# Patient Record
Sex: FEMALE | Race: WHITE | Employment: UNEMPLOYED | URBAN - METROPOLITAN AREA
[De-identification: names, ages, dates, MRNs, and addresses within clinical notes are randomized per-mention and may not be internally consistent; named-entity substitution may affect disease eponyms.]

---

## 2021-01-04 ENCOUNTER — VIRTUAL VISIT (OUTPATIENT)
Dept: FAMILY MEDICINE CLINIC | Age: 51
End: 2021-01-04
Payer: COMMERCIAL

## 2021-01-04 DIAGNOSIS — Z91.89 AT INCREASED RISK OF EXPOSURE TO COVID-19 VIRUS: Primary | ICD-10-CM

## 2021-01-04 DIAGNOSIS — Z91.89 AT INCREASED RISK OF EXPOSURE TO COVID-19 VIRUS: ICD-10-CM

## 2021-01-04 PROCEDURE — 99441 PR PHYS/QHP TELEPHONE EVALUATION 5-10 MIN: CPT | Performed by: PHYSICIAN ASSISTANT

## 2021-01-04 ASSESSMENT — ENCOUNTER SYMPTOMS
CHEST TIGHTNESS: 0
DIARRHEA: 0
RHINORRHEA: 1
SINUS PRESSURE: 1
BACK PAIN: 0
VOMITING: 0
SHORTNESS OF BREATH: 0
TROUBLE SWALLOWING: 0
COUGH: 0
ABDOMINAL PAIN: 0

## 2021-01-04 NOTE — PROGRESS NOTES
change. HENT: Positive for rhinorrhea and sinus pressure. Negative for drooling, ear pain, nosebleeds and trouble swallowing. Respiratory: Negative for cough, chest tightness and shortness of breath. Cardiovascular: Negative for chest pain and leg swelling. Gastrointestinal: Negative for abdominal pain, diarrhea and vomiting. Endocrine: Negative for polydipsia and polyphagia. Genitourinary: Negative for dysuria, flank pain and frequency. Musculoskeletal: Negative for back pain and myalgias. Skin: Negative for pallor and rash. Neurological: Negative for syncope, weakness and headaches. Hematological: Does not bruise/bleed easily. All other systems reviewed and are negative. Prior to Visit Medications    Not on File       No past medical history on file. No past surgical history on file.   Social History     Socioeconomic History    Marital status: Unknown     Spouse name: Not on file    Number of children: Not on file    Years of education: Not on file    Highest education level: Not on file   Occupational History    Not on file   Social Needs    Financial resource strain: Not on file    Food insecurity     Worry: Not on file     Inability: Not on file    Transportation needs     Medical: Not on file     Non-medical: Not on file   Tobacco Use    Smoking status: Not on file   Substance and Sexual Activity    Alcohol use: Not on file    Drug use: Not on file    Sexual activity: Not on file   Lifestyle    Physical activity     Days per week: Not on file     Minutes per session: Not on file    Stress: Not on file   Relationships    Social connections     Talks on phone: Not on file     Gets together: Not on file     Attends Sabianism service: Not on file     Active member of club or organization: Not on file     Attends meetings of clubs or organizations: Not on file     Relationship status: Not on file    Intimate partner violence     Fear of current or ex partner: Not on file     Emotionally abused: Not on file     Physically abused: Not on file     Forced sexual activity: Not on file   Other Topics Concern    Not on file   Social History Narrative    Not on file     No family history on file. Not on File    PMH, Surgical Hx, Family Hx, and Social Hx reviewed and updated. Health Maintenance reviewed. PHYSICAL EXAMINATION:  \"[x]\" Indicates a positive item  \"[]\" Indicates a negative item    Vital Signs: (As obtained by patient/caregiver or practitioner observation)    Blood pressure-  Heart rate-    Respiratory rate-    Temperature-  Pulse oximetry-     Constitutional: [x] Appears well-developed and well-nourished [x] No apparent distress      [] Abnormal-   Mental status  [x] Alert and awake  [x] Oriented to person/place/time [x]Able to follow commands      Eyes:  EOM    []  Normal  [] Abnormal-  Sclera  [x]  Normal  [] Abnormal -         Discharge [x]  None visible  [] Abnormal -    HENT:   [x] Normocephalic, atraumatic. [] Abnormal   [x] Mouth/Throat: Mucous membranes are moist.     External Ears [x] Normal  [] Abnormal-     Neck: [x] No visualized mass     Pulmonary/Chest: [x] Respiratory effort normal.  [x] No visualized signs of difficulty breathing or respiratory distress        [] Abnormal-      Musculoskeletal:   [x] Normal gait with no signs of ataxia         [x] Normal range of motion of neck        [] Abnormal-       Neurological:       [x] No Facial Asymmetry (Cranial nerve 7 motor function) (limited exam to video visit)          [x] No gaze palsy        [] Abnormal-         Skin:        [x] No significant exanthematous lesions or discoloration noted on facial skin         [] Abnormal-            Psychiatric:       [x] Normal Affect [x] No Hallucinations        [] Abnormal-     Other pertinent observable physical exam findings-   No results found for this or any previous visit.     ASSESSMENT/PLAN:  Assessment & Plan   There are no diagnoses linked to this encounter. No orders of the defined types were placed in this encounter. No orders of the defined types were placed in this encounter. There are no discontinued medications. No follow-ups on file. Reviewed with the patient: current clinical status, medications, activities and diet. Side effects, adverse effects of the medication prescribed today, as well as treatment plan/ rationale and result expectations have been discussed with the patient who expresses understanding and desires to proceed. Close follow up to evaluate treatment results and for coordination of care. I have reviewed the patient's medical history in detail and updated the computerized patient record. Patient identification was verified at the start of the visit: Yes    Total time spent on this encounter: Not billed by time      --XIMENA Maharaj on 1/4/2021 at 6:16 PM    An electronic signature was used to authenticate this note.

## 2021-01-06 LAB
SARS-COV-2: DETECTED
SOURCE: ABNORMAL

## 2023-06-16 LAB — COBALAMIN (VITAMIN B12) (PG/ML) IN SER/PLAS: 493 PG/ML (ref 211–911)

## 2023-06-24 LAB
ABO GROUP (TYPE) IN BLOOD: NORMAL
ANION GAP IN SER/PLAS: 10 MMOL/L (ref 10–20)
ANTIBODY SCREEN: NORMAL
CALCIUM (MG/DL) IN SER/PLAS: 9.5 MG/DL (ref 8.6–10.3)
CARBON DIOXIDE, TOTAL (MMOL/L) IN SER/PLAS: 29 MMOL/L (ref 21–32)
CHLORIDE (MMOL/L) IN SER/PLAS: 105 MMOL/L (ref 98–107)
CHORIOGONADOTROPIN (MIU/ML) IN SER/PLAS: 2 MIU/ML
CREATININE (MG/DL) IN SER/PLAS: 0.8 MG/DL (ref 0.5–1.05)
ERYTHROCYTE DISTRIBUTION WIDTH (RATIO) BY AUTOMATED COUNT: 12.6 % (ref 11.5–14.5)
ERYTHROCYTE MEAN CORPUSCULAR HEMOGLOBIN CONCENTRATION (G/DL) BY AUTOMATED: 32.5 G/DL (ref 32–36)
ERYTHROCYTE MEAN CORPUSCULAR VOLUME (FL) BY AUTOMATED COUNT: 93 FL (ref 80–100)
ERYTHROCYTES (10*6/UL) IN BLOOD BY AUTOMATED COUNT: 4.16 X10E12/L (ref 4–5.2)
GFR FEMALE: 88 ML/MIN/1.73M2
GLUCOSE (MG/DL) IN SER/PLAS: 99 MG/DL (ref 74–99)
HEMATOCRIT (%) IN BLOOD BY AUTOMATED COUNT: 38.5 % (ref 36–46)
HEMOGLOBIN (G/DL) IN BLOOD: 12.5 G/DL (ref 12–16)
LEUKOCYTES (10*3/UL) IN BLOOD BY AUTOMATED COUNT: 5.7 X10E9/L (ref 4.4–11.3)
PLATELETS (10*3/UL) IN BLOOD AUTOMATED COUNT: 190 X10E9/L (ref 150–450)
POTASSIUM (MMOL/L) IN SER/PLAS: 4.1 MMOL/L (ref 3.5–5.3)
RH FACTOR: NORMAL
SODIUM (MMOL/L) IN SER/PLAS: 140 MMOL/L (ref 136–145)
UREA NITROGEN (MG/DL) IN SER/PLAS: 17 MG/DL (ref 6–23)

## 2023-06-27 ENCOUNTER — HOSPITAL ENCOUNTER (OUTPATIENT)
Dept: DATA CONVERSION | Facility: HOSPITAL | Age: 53
End: 2023-06-27
Attending: OBSTETRICS & GYNECOLOGY | Admitting: OBSTETRICS & GYNECOLOGY
Payer: COMMERCIAL

## 2023-06-27 DIAGNOSIS — N39.41 URGE INCONTINENCE: ICD-10-CM

## 2023-06-27 DIAGNOSIS — R35.0 FREQUENCY OF MICTURITION: ICD-10-CM

## 2023-06-27 LAB — URINE CULTURE: ABNORMAL

## 2023-09-16 ENCOUNTER — HOSPITAL ENCOUNTER (OUTPATIENT)
Facility: HOSPITAL | Age: 53
Setting detail: OUTPATIENT SURGERY
End: 2023-09-16
Attending: OBSTETRICS & GYNECOLOGY | Admitting: OBSTETRICS & GYNECOLOGY
Payer: COMMERCIAL

## 2023-09-30 NOTE — H&P
History of Present Illness:   Pregnant/Lactating:  ·  Are You Pregnant no   ·  Are You Currently Breastfeeding no     History Present Illness:  Reason for surgery: Urge incontinence   HPI:     urge incontinence    Allergies:        Allergies:  ·  Compazine : Other  ·  minocycline : Other       Intolerances:  ·  Keflex : Nausea/Vomiting  ·  fentanyl : Nausea/Vomiting  ·  ropivacaine : Nausea/Vomiting    Home Medication Review:   Home Medications Reviewed: yes     Impression/Procedure:   ·  Impression and Planned Procedure: cystoscopy Botox 150 units       ERAS (Enhanced Recovery After Surgery):  ·  ERAS Patient: no       Physical Exam by System:    Constitutional: Well developed, awake/alert/oriented  x3, no distress, alert and cooperative   Head/Neck: Neck supple   Respiratory/Thorax: No respiratory distress   Cardiovascular: No cardiac distress   Musculoskeletal: ROM intact, no joint swelling, normal  strength   Lymphatic: No lymphadenopathy   Skin: Warm and dry, no lesions, no rashes     Consent:   COVID-19 Consent:  ·  COVID-19 Risk Consent Surgeon has reviewed key risks related to the risk of marta COVID-19 and if they contract COVID-19 what the risks are.       Electronic Signatures:  Ruben Winston)  (Signed 27-Jun-2023 10:46)   Authored: History of Present Illness, Allergies, Home  Medication Review, Impression/Procedure, ERAS, Physical Exam, Consent, Note Completion      Last Updated: 27-Jun-2023 10:46 by Ruben Winston)

## 2023-10-02 NOTE — OP NOTE
PROCEDURE DETAILS    Preoperative Diagnosis:      Postoperative Diagnosis:  urge incontinence, N39.41    Surgeon: Ruben Winston  Resident/Fellow/Other Assistant: Michael Camacho    Procedure:  1. OPERATIVE CYSTOSCOPY, INTRADETRUSOR BOTOX 150    Anesthesia: No anesthesiologist associated with this case  Estimated Blood Loss: min  Findings:  normal anatomy  Specimens(s) Collected: no,           Operative Report:     Operative procedure patient was taken to the operating room after informed consent was obtained she is placed in supine position mask anesthesia  was started without complication. She is prepped and draped in the normal sterile fashion and operative cystoscopy was placed inside the bladder bowel surfaces were surveyed bilateral reflux was noted from the ureter orifices. A urethral inspection was  then performed upon removal of the cystoscope the bladder was drained and refilled clear sterile water. In a fan like projection 0.5ml aliquots of diluted Botox were introduced into the bladder muscle. The flush was used to inject into the trigone. Total  Botox use was 150U in approximately 20ml saline. Scant bleeding was noted from the urothelial tissue, the bladder was drained and patient was awoken from anesthesia and transferred to PACU in stable condition.                        Attestation:   Note Completion:  Attending Attestation I performed the procedure without a resident         Electronic Signatures:  Ruben Winston)  (Signed 27-Jun-2023 12:29)   Authored: Post-Operative Note, Chart Review, Note Completion      Last Updated: 27-Jun-2023 12:29 by Ruben Winston)

## 2023-10-09 ENCOUNTER — HOSPITAL ENCOUNTER (OUTPATIENT)
Dept: RADIOLOGY | Facility: HOSPITAL | Age: 53
Discharge: HOME | End: 2023-10-09
Payer: COMMERCIAL

## 2023-10-09 VITALS — BODY MASS INDEX: 26.52 KG/M2 | HEIGHT: 66 IN | WEIGHT: 165 LBS

## 2023-10-09 DIAGNOSIS — Z12.31 ENCOUNTER FOR SCREENING MAMMOGRAM FOR MALIGNANT NEOPLASM OF BREAST: ICD-10-CM

## 2023-10-09 PROCEDURE — 77067 SCR MAMMO BI INCL CAD: CPT | Mod: 50

## 2023-10-09 PROCEDURE — 77067 SCR MAMMO BI INCL CAD: CPT | Mod: BILATERAL PROCEDURE | Performed by: RADIOLOGY

## 2023-10-09 PROCEDURE — 77063 BREAST TOMOSYNTHESIS BI: CPT | Mod: BILATERAL PROCEDURE | Performed by: RADIOLOGY

## 2023-10-20 PROBLEM — H93.13 TINNITUS OF BOTH EARS: Status: ACTIVE | Noted: 2023-10-20

## 2023-10-20 PROBLEM — R45.4 IRRITABILITY: Status: ACTIVE | Noted: 2023-10-20

## 2023-10-20 PROBLEM — Z52.4 DONOR OF KIDNEY FOR TRANSPLANT: Status: ACTIVE | Noted: 2023-10-20

## 2023-10-20 PROBLEM — C43.9 MALIGNANT MELANOMA (MULTI): Status: ACTIVE | Noted: 2023-06-30

## 2023-10-20 PROBLEM — R10.9 FLANK PAIN: Status: ACTIVE | Noted: 2023-10-20

## 2023-10-20 PROBLEM — M79.18 MYALGIA OF PELVIC FLOOR: Status: ACTIVE | Noted: 2023-10-20

## 2023-10-20 PROBLEM — E55.9 VITAMIN D DEFICIENCY: Status: ACTIVE | Noted: 2018-01-04

## 2023-10-20 PROBLEM — E53.8 VITAMIN B12 DEFICIENCY (NON ANEMIC): Status: ACTIVE | Noted: 2018-02-24

## 2023-10-20 PROBLEM — R45.86 MOOD CHANGE: Status: ACTIVE | Noted: 2023-10-20

## 2023-10-20 PROBLEM — G43.909 MIGRAINE: Status: ACTIVE | Noted: 2018-02-24

## 2023-10-20 PROBLEM — N39.41 URGE INCONTINENCE OF URINE: Status: ACTIVE | Noted: 2023-10-20

## 2023-10-20 PROBLEM — R10.2 PELVIC PAIN: Status: ACTIVE | Noted: 2023-10-20

## 2023-10-20 PROBLEM — F43.0 STRESS REACTION: Status: ACTIVE | Noted: 2023-06-30

## 2023-10-20 PROBLEM — N20.0 CALCULUS OF KIDNEY: Status: ACTIVE | Noted: 2020-09-18

## 2023-10-20 PROBLEM — R23.2 VASOMOTOR FLUSHING: Status: ACTIVE | Noted: 2023-10-20

## 2023-10-20 PROBLEM — R30.0 DYSURIA: Status: ACTIVE | Noted: 2023-10-20

## 2023-10-20 PROBLEM — R39.89 SENSATION OF PRESSURE IN BLADDER AREA: Status: ACTIVE | Noted: 2023-10-20

## 2023-10-20 PROBLEM — R82.991 HYPOCITRATURIA: Status: ACTIVE | Noted: 2023-10-20

## 2023-10-20 PROBLEM — M79.643 HAND PAIN: Status: ACTIVE | Noted: 2023-10-20

## 2023-10-20 PROBLEM — M54.12 CERVICAL RADICULOPATHY, ACUTE: Status: ACTIVE | Noted: 2023-10-20

## 2023-10-20 PROBLEM — G47.00 INSOMNIA: Status: ACTIVE | Noted: 2021-10-19

## 2023-10-20 PROBLEM — M18.12 OSTEOARTHRITIS OF CARPOMETACARPAL (CMC) JOINT OF LEFT THUMB: Status: ACTIVE | Noted: 2023-06-30

## 2023-10-20 PROBLEM — H90.3 BILATERAL HIGH FREQUENCY SENSORINEURAL HEARING LOSS: Status: ACTIVE | Noted: 2023-10-20

## 2023-10-20 RX ORDER — FLAXSEED OIL 1000 MG
2000 CAPSULE ORAL DAILY
COMMUNITY

## 2023-10-20 RX ORDER — TIZANIDINE 4 MG/1
8 TABLET ORAL EVERY 6 HOURS PRN
COMMUNITY
Start: 2023-01-25 | End: 2023-12-06 | Stop reason: WASHOUT

## 2023-10-20 RX ORDER — OMEPRAZOLE 20 MG/1
1 TABLET, DELAYED RELEASE ORAL DAILY
COMMUNITY

## 2023-10-20 RX ORDER — IBUPROFEN 200 MG
200 TABLET ORAL EVERY 6 HOURS PRN
COMMUNITY
End: 2023-12-06 | Stop reason: WASHOUT

## 2023-10-20 RX ORDER — SUMATRIPTAN SUCCINATE 25 MG/1
25 TABLET ORAL ONCE AS NEEDED
COMMUNITY
Start: 2016-01-25 | End: 2023-12-06 | Stop reason: WASHOUT

## 2023-10-20 RX ORDER — VIT C/E/ZN/COPPR/LUTEIN/ZEAXAN 250MG-90MG
CAPSULE ORAL
COMMUNITY
Start: 2012-07-26 | End: 2023-12-06 | Stop reason: WASHOUT

## 2023-10-20 RX ORDER — FLUTICASONE PROPIONATE 50 MCG
1 SPRAY, SUSPENSION (ML) NASAL NIGHTLY
COMMUNITY
Start: 2016-01-25 | End: 2023-12-06 | Stop reason: WASHOUT

## 2023-10-20 RX ORDER — POLYMYXIN B SULFATE AND TRIMETHOPRIM 1; 10000 MG/ML; [USP'U]/ML
SOLUTION OPHTHALMIC
COMMUNITY
Start: 2023-09-18 | End: 2023-12-06 | Stop reason: WASHOUT

## 2023-10-20 RX ORDER — POTASSIUM CITRATE 15 MEQ/1
15 TABLET, EXTENDED RELEASE ORAL DAILY
COMMUNITY
Start: 2021-11-17 | End: 2024-04-22 | Stop reason: SDUPTHER

## 2023-10-20 RX ORDER — BUSPIRONE HYDROCHLORIDE 5 MG/1
5 TABLET ORAL 2 TIMES DAILY
COMMUNITY
Start: 2023-09-28 | End: 2024-09-27

## 2023-10-20 RX ORDER — SUMATRIPTAN 50 MG/1
0.5 TABLET, FILM COATED ORAL ONCE AS NEEDED
COMMUNITY

## 2023-10-20 RX ORDER — ACETAMINOPHEN 500 MG
2000 TABLET ORAL DAILY
COMMUNITY

## 2023-10-20 RX ORDER — FLUOCINONIDE 0.5 MG/G
1 CREAM TOPICAL 2 TIMES DAILY
COMMUNITY
Start: 2023-05-25 | End: 2023-12-06 | Stop reason: WASHOUT

## 2023-10-20 RX ORDER — ESTRADIOL/NORETHINDRONE ACETATE TRANSDERMAL SYSTEM .05; .14 MG/D; MG/D
1 PATCH, EXTENDED RELEASE TRANSDERMAL 2 TIMES WEEKLY
COMMUNITY
Start: 2023-07-27

## 2023-10-20 RX ORDER — ASCORBIC ACID 500 MG
TABLET,CHEWABLE ORAL DAILY
COMMUNITY
Start: 2022-11-09 | End: 2023-10-23

## 2023-10-20 RX ORDER — FLUOXETINE HYDROCHLORIDE 20 MG/1
20 CAPSULE ORAL DAILY
COMMUNITY
Start: 2016-01-27 | End: 2023-12-06 | Stop reason: WASHOUT

## 2023-10-20 RX ORDER — CIPROFLOXACIN 250 MG/1
250 TABLET, FILM COATED ORAL 2 TIMES DAILY
COMMUNITY
Start: 2023-06-26 | End: 2023-10-23

## 2023-10-23 ENCOUNTER — TELEMEDICINE (OUTPATIENT)
Dept: OBSTETRICS AND GYNECOLOGY | Facility: CLINIC | Age: 53
End: 2023-10-23
Payer: COMMERCIAL

## 2023-10-23 DIAGNOSIS — R30.0 DYSURIA: Primary | ICD-10-CM

## 2023-10-23 PROCEDURE — 99442 PR PHYS/QHP TELEPHONE EVALUATION 11-20 MIN: CPT | Performed by: OBSTETRICS & GYNECOLOGY

## 2023-10-23 NOTE — PROGRESS NOTES
Virtual or Telephone Consent    A telephone visit (audio only) between the patient (at the originating site) and the provider (at the distant site) was utilized to provide this telehealth service.   Verbal consent was requested and obtained from Lili Cuevas on this date, 10/23/23 for a telehealth visit.     GYN PROGRESS NOTE          CC:  hotflash mx      HPI:  vasomotor symptoms gone    No significant mood changes        ROS:  GEN - no fevers or chills  RESP - no SOB or cough  GYN - see HPI      HISTORY:  Past Medical History:   Diagnosis Date    Anesthesia of skin 03/15/2021    Numbness and tingling    Disease of digestive system, unspecified 03/15/2021    Digestive problems    Other specified personal risk factors, not elsewhere classified 03/15/2021    History of complications due to general anesthesia    Personal history of other diseases of the musculoskeletal system and connective tissue 03/15/2021    History of arthritis    Unspecified visual loss 03/15/2021    Vision problems     Past Surgical History:   Procedure Laterality Date    BREAST SURGERY  08/08/2018    Breast Surgery    DILATION AND CURETTAGE OF UTERUS  08/08/2018    Dilation And Curettage Of Cervical Stump    INCISIONAL BREAST BIOPSY  06/24/2016    Incisional Breast Biopsy    KNEE SURGERY  08/08/2018    Knee Surgery    OTHER SURGICAL HISTORY  03/15/2021    Knee arthroscopy    OTHER SURGICAL HISTORY  04/28/2021    Injection of botulinum toxin type A     Social History     Socioeconomic History    Marital status:      Spouse name: Not on file    Number of children: Not on file    Years of education: Not on file    Highest education level: Not on file   Occupational History    Not on file   Tobacco Use    Smoking status: Not on file    Smokeless tobacco: Not on file   Substance and Sexual Activity    Alcohol use: Not on file    Drug use: Not on file    Sexual activity: Not on file   Other Topics Concern    Not on file   Social History  Narrative    Not on file     Social Determinants of Health     Financial Resource Strain: Not on file   Food Insecurity: Not on file   Transportation Needs: Not on file   Physical Activity: Not on file   Stress: Not on file   Social Connections: Not on file   Intimate Partner Violence: Not on file   Housing Stability: Not on file     Cancer-related family history includes Cancer in her paternal grandfather.       PHYSICAL EXAM:  LMP  (LMP Unknown)   GEN:  A&O, NAD  HEENT:  head HC/AT, no visible goiter  PSYCH:  normal affect, non-anxious      IMPRESSION/PLAN:  52yo vasomotor symptoms, mood variation, dysuria      Dc vitamin c  Urine culture prn    11min        Ruben Winston MD

## 2023-12-02 ENCOUNTER — OFFICE VISIT (OUTPATIENT)
Dept: FAMILY MEDICINE CLINIC | Age: 53
End: 2023-12-02
Payer: COMMERCIAL

## 2023-12-02 VITALS
BODY MASS INDEX: 27.32 KG/M2 | DIASTOLIC BLOOD PRESSURE: 68 MMHG | TEMPERATURE: 97.7 F | WEIGHT: 170 LBS | SYSTOLIC BLOOD PRESSURE: 114 MMHG | RESPIRATION RATE: 12 BRPM | HEIGHT: 66 IN | HEART RATE: 74 BPM | OXYGEN SATURATION: 98 %

## 2023-12-02 DIAGNOSIS — U07.1 COVID-19: Primary | ICD-10-CM

## 2023-12-02 PROCEDURE — 99203 OFFICE O/P NEW LOW 30 MIN: CPT | Performed by: NURSE PRACTITIONER

## 2023-12-02 RX ORDER — BUSPIRONE HYDROCHLORIDE 5 MG/1
5 TABLET ORAL NIGHTLY
COMMUNITY
Start: 2023-09-28 | End: 2024-09-27

## 2023-12-02 RX ORDER — POTASSIUM CITRATE 15 MEQ/1
TABLET, EXTENDED RELEASE ORAL
COMMUNITY
Start: 2021-11-17

## 2023-12-02 RX ORDER — OMEPRAZOLE 20 MG/1
CAPSULE, DELAYED RELEASE ORAL
COMMUNITY

## 2023-12-02 SDOH — ECONOMIC STABILITY: HOUSING INSECURITY
IN THE LAST 12 MONTHS, WAS THERE A TIME WHEN YOU DID NOT HAVE A STEADY PLACE TO SLEEP OR SLEPT IN A SHELTER (INCLUDING NOW)?: NO

## 2023-12-02 SDOH — ECONOMIC STABILITY: FOOD INSECURITY: WITHIN THE PAST 12 MONTHS, YOU WORRIED THAT YOUR FOOD WOULD RUN OUT BEFORE YOU GOT MONEY TO BUY MORE.: NEVER TRUE

## 2023-12-02 SDOH — ECONOMIC STABILITY: INCOME INSECURITY: HOW HARD IS IT FOR YOU TO PAY FOR THE VERY BASICS LIKE FOOD, HOUSING, MEDICAL CARE, AND HEATING?: NOT HARD AT ALL

## 2023-12-02 SDOH — ECONOMIC STABILITY: FOOD INSECURITY: WITHIN THE PAST 12 MONTHS, THE FOOD YOU BOUGHT JUST DIDN'T LAST AND YOU DIDN'T HAVE MONEY TO GET MORE.: NEVER TRUE

## 2023-12-02 ASSESSMENT — PATIENT HEALTH QUESTIONNAIRE - PHQ9
2. FEELING DOWN, DEPRESSED OR HOPELESS: 0
SUM OF ALL RESPONSES TO PHQ QUESTIONS 1-9: 0
SUM OF ALL RESPONSES TO PHQ9 QUESTIONS 1 & 2: 0
SUM OF ALL RESPONSES TO PHQ QUESTIONS 1-9: 0
1. LITTLE INTEREST OR PLEASURE IN DOING THINGS: 0

## 2023-12-02 ASSESSMENT — ENCOUNTER SYMPTOMS
CONSTIPATION: 0
VOICE CHANGE: 0
CHEST TIGHTNESS: 0
SINUS PRESSURE: 0
COUGH: 0
WHEEZING: 0
SINUS PAIN: 0
EYE PAIN: 0
EYE ITCHING: 0
STRIDOR: 0
NAUSEA: 0
VOMITING: 0
SORE THROAT: 1
TROUBLE SWALLOWING: 1
EYE DISCHARGE: 0
SHORTNESS OF BREATH: 0
ABDOMINAL PAIN: 0
DIARRHEA: 0
RHINORRHEA: 0
EYE REDNESS: 0

## 2023-12-02 NOTE — PROGRESS NOTES
Subjective:      Patient ID: Charly Marte is a 48 y.o. female who presents today for:  Chief Complaint   Patient presents with    Other     Patient present today with a positive home Covid test from yesterday. She has a very bad sore throat. She tried advil with little relief. HPI  Patient is here with sore throat. Says she took a home Covid test yesterday and was +. Says daughter was + for Covid in office yesterday and she was exposed. No past medical history on file. No past surgical history on file. Social History     Socioeconomic History    Marital status: Unknown     Spouse name: Not on file    Number of children: Not on file    Years of education: Not on file    Highest education level: Not on file   Occupational History    Not on file   Tobacco Use    Smoking status: Not on file    Smokeless tobacco: Not on file   Substance and Sexual Activity    Alcohol use: Not on file    Drug use: Not on file    Sexual activity: Not on file   Other Topics Concern    Not on file   Social History Narrative    Not on file     Social Determinants of Health     Financial Resource Strain: Not on file   Food Insecurity: Not on file   Transportation Needs: Not on file   Physical Activity: Not on file   Stress: Not on file   Social Connections: Not on file   Intimate Partner Violence: Not on file   Housing Stability: Not on file     No family history on file.   Allergies   Allergen Reactions    Prochlorperazine Swelling, Other (See Comments) and Rash     Swelling, Flushing, Unknown    Other Reaction(s): Flushing, Unknown    Cephalexin Itching and Nausea Only     Other Reaction(s): Unknown    Minocycline Other (See Comments)     Ringing in Ears      Other Reaction(s): Unknown    Other Reaction(s): Unknown    Prochlorperazine Edisylate Swelling    Amoxicillin Nausea And Vomiting     Other Reaction(s): stomach upset / RASH    Fentanyl Nausea And Vomiting and Nausea Only    Ropivacaine Nausea And Vomiting and Nausea

## 2023-12-06 VITALS — HEIGHT: 66 IN | BODY MASS INDEX: 27.32 KG/M2 | WEIGHT: 170 LBS

## 2023-12-06 NOTE — PREPROCEDURE INSTRUCTIONS
Reviewed pre-op instructions with patient including NPO after midnight, must have , hospital and check in location, and day of surgery routine. Patient states she is going to [place] on Saturday, 12/9/23 for lab collection

## 2023-12-09 ENCOUNTER — LAB (OUTPATIENT)
Dept: LAB | Facility: LAB | Age: 53
End: 2023-12-09
Payer: COMMERCIAL

## 2023-12-09 DIAGNOSIS — N39.41 URGE INCONTINENCE: Primary | ICD-10-CM

## 2023-12-09 LAB
ABO GROUP (TYPE) IN BLOOD: NORMAL
ANION GAP SERPL CALC-SCNC: 12 MMOL/L (ref 10–20)
ANTIBODY SCREEN: NORMAL
B-HCG SERPL-ACNC: <2 MIU/ML
BUN SERPL-MCNC: 13 MG/DL (ref 6–23)
CALCIUM SERPL-MCNC: 9.2 MG/DL (ref 8.6–10.3)
CHLORIDE SERPL-SCNC: 103 MMOL/L (ref 98–107)
CO2 SERPL-SCNC: 27 MMOL/L (ref 21–32)
CREAT SERPL-MCNC: 0.83 MG/DL (ref 0.5–1.05)
ERYTHROCYTE [DISTWIDTH] IN BLOOD BY AUTOMATED COUNT: 12.2 % (ref 11.5–14.5)
GFR SERPL CREATININE-BSD FRML MDRD: 84 ML/MIN/1.73M*2
GLUCOSE SERPL-MCNC: 89 MG/DL (ref 74–99)
HCT VFR BLD AUTO: 40.4 % (ref 36–46)
HGB BLD-MCNC: 13.3 G/DL (ref 12–16)
MCH RBC QN AUTO: 30.1 PG (ref 26–34)
MCHC RBC AUTO-ENTMCNC: 32.9 G/DL (ref 32–36)
MCV RBC AUTO: 91 FL (ref 80–100)
NRBC BLD-RTO: 0 /100 WBCS (ref 0–0)
PLATELET # BLD AUTO: 206 X10*3/UL (ref 150–450)
POTASSIUM SERPL-SCNC: 3.9 MMOL/L (ref 3.5–5.3)
RBC # BLD AUTO: 4.42 X10*6/UL (ref 4–5.2)
RH FACTOR (ANTIGEN D): NORMAL
SODIUM SERPL-SCNC: 138 MMOL/L (ref 136–145)
WBC # BLD AUTO: 5.3 X10*3/UL (ref 4.4–11.3)

## 2023-12-09 PROCEDURE — 86900 BLOOD TYPING SEROLOGIC ABO: CPT

## 2023-12-09 PROCEDURE — 86901 BLOOD TYPING SEROLOGIC RH(D): CPT

## 2023-12-09 PROCEDURE — 80048 BASIC METABOLIC PNL TOTAL CA: CPT

## 2023-12-09 PROCEDURE — 84702 CHORIONIC GONADOTROPIN TEST: CPT

## 2023-12-09 PROCEDURE — 36415 COLL VENOUS BLD VENIPUNCTURE: CPT

## 2023-12-09 PROCEDURE — 86850 RBC ANTIBODY SCREEN: CPT

## 2023-12-09 PROCEDURE — 87086 URINE CULTURE/COLONY COUNT: CPT

## 2023-12-09 PROCEDURE — 85027 COMPLETE CBC AUTOMATED: CPT

## 2023-12-10 LAB — BACTERIA UR CULT: NORMAL

## 2023-12-13 DIAGNOSIS — N39.41 URGE INCONTINENCE OF URINE: ICD-10-CM

## 2023-12-13 RX ORDER — ONABOTULINUMTOXINA 200 [USP'U]/1
150 INJECTION, POWDER, LYOPHILIZED, FOR SOLUTION INTRADERMAL; INTRAMUSCULAR ONCE
Qty: 1 EACH | Refills: 0 | Status: SHIPPED | OUTPATIENT
Start: 2023-12-13 | End: 2023-12-13

## 2023-12-20 ENCOUNTER — PROCEDURE VISIT (OUTPATIENT)
Dept: OBSTETRICS AND GYNECOLOGY | Facility: CLINIC | Age: 53
End: 2023-12-20
Payer: COMMERCIAL

## 2023-12-20 VITALS — SYSTOLIC BLOOD PRESSURE: 150 MMHG | DIASTOLIC BLOOD PRESSURE: 70 MMHG

## 2023-12-20 DIAGNOSIS — N39.41 URGE INCONTINENCE: ICD-10-CM

## 2023-12-20 LAB
POC BILIRUBIN, URINE: NEGATIVE
POC BLOOD, URINE: ABNORMAL
POC GLUCOSE, URINE: NEGATIVE MG/DL
POC KETONES, URINE: NEGATIVE MG/DL
POC LEUKOCYTES, URINE: ABNORMAL
POC NITRITE,URINE: NEGATIVE
POC PH, URINE: 6 PH
POC PROTEIN, URINE: NEGATIVE MG/DL
POC SPECIFIC GRAVITY, URINE: 1.02
POC UROBILINOGEN, URINE: 0.2 EU/DL

## 2023-12-20 PROCEDURE — 52287 CYSTOSCOPY CHEMODENERVATION: CPT | Performed by: OBSTETRICS & GYNECOLOGY

## 2023-12-20 PROCEDURE — 81003 URINALYSIS AUTO W/O SCOPE: CPT | Performed by: OBSTETRICS & GYNECOLOGY

## 2023-12-20 RX ORDER — DOXYCYCLINE 100 MG/1
100 TABLET ORAL ONCE
Status: COMPLETED | OUTPATIENT
Start: 2023-12-20 | End: 2023-12-20

## 2023-12-20 RX ORDER — LIDOCAINE HYDROCHLORIDE 20 MG/ML
1 JELLY TOPICAL ONCE
Status: COMPLETED | OUTPATIENT
Start: 2023-12-20 | End: 2023-12-20

## 2023-12-20 RX ADMIN — DOXYCYCLINE 100 MG: 100 TABLET ORAL at 12:22

## 2023-12-20 RX ADMIN — LIDOCAINE HYDROCHLORIDE 1 APPLICATION: 20 JELLY TOPICAL at 12:22

## 2023-12-20 ASSESSMENT — PAIN SCALES - GENERAL: PAINLEVEL: 0-NO PAIN

## 2023-12-20 NOTE — PROGRESS NOTES
"GYN PROGRESS NOTE          CC:   No chief complaint on file.      HPI:  Patient answers are not available for this visit.  HPI    Lili is here today as an EST patient.   She is a 54 yo patient here for a Cystoscopy with Intradetrusor Botox.  Her last visit was 10/23/23. Her last injection was 6/23. Her symptoms are tolerable and controlled.  Today, she has no complaints.   The procedure was explained by SIMON GOMEZ and Dr. Winston  Consent was signed.  The patient had no questions or concerns at this time.  Doxycyline 100mg PO was given per office protocol.  See MAR  Time out was completed.  Botox 150 Units \"patient supply\"  I/O UA obtained see results.   LOT [id number]  EXP  05/31/2026  Chaperone SIMON Gomez  Discharge instructions were reviewed.  The patient did not have any questions or concerns at this time. A follow up was scheduled.   Last edited by Kandis Muniz RN on 12/20/2023 11:45 AM.            ROS:  GEN - no fevers or chills  RESP - no SOB or cough  GYN - see HPI      HISTORY:  Past Medical History:   Diagnosis Date    Anesthesia of skin 03/15/2021    Numbness and tingling    Anxiety     Arthritis     Chronic headaches     Disease of digestive system, unspecified 03/15/2021    Digestive problems    Other specified personal risk factors, not elsewhere classified 03/15/2021    History of complications due to general anesthesia    Overactive bladder     Personal history of other diseases of the musculoskeletal system and connective tissue 03/15/2021    History of arthritis    PONV (postoperative nausea and vomiting)     Unspecified visual loss 03/15/2021    Vision problems    Urinary tract infection     Vision loss     Wears contact lenses     instructed to remove contacts prior to procedure     Past Surgical History:   Procedure Laterality Date    BREAST SURGERY  08/08/2018    Breast Surgery    COLONOSCOPY      CYSTOSCOPY      DILATION AND CURETTAGE OF UTERUS  08/08/2018    Dilation And Curettage Of " Cervical Stump    ENDOMETRIAL ABLATION      INCISIONAL BREAST BIOPSY  2016    Incisional Breast Biopsy    KNEE SURGERY  2018    Knee Surgery- arthroscopic    OTHER SURGICAL HISTORY  2021    Injection of botulinum toxin type A    UPPER GASTROINTESTINAL ENDOSCOPY      WRIST SURGERY Left      Social History     Socioeconomic History    Marital status:      Spouse name: Not on file    Number of children: Not on file    Years of education: Not on file    Highest education level: Not on file   Occupational History    Not on file   Tobacco Use    Smoking status: Former     Packs/day: 0.25     Years: 40.00     Additional pack years: 0.00     Total pack years: 10.00     Types: Cigarettes     Quit date: 2019     Years since quittin.9    Smokeless tobacco: Never   Vaping Use    Vaping Use: Never used   Substance and Sexual Activity    Alcohol use: Not Currently     Comment: socially    Drug use: Never    Sexual activity: Defer     Comment: LMP  ; h/o uterine ablation   Other Topics Concern    Not on file   Social History Narrative    Not on file     Social Determinants of Health     Financial Resource Strain: Not on file   Food Insecurity: Not on file   Transportation Needs: Not on file   Physical Activity: Not on file   Stress: Not on file   Social Connections: Not on file   Intimate Partner Violence: Not on file   Housing Stability: Not on file     Cancer-related family history includes Cancer in her paternal grandfather.       PHYSICAL EXAM:  /70 (BP Location: Left arm, Patient Position: Sitting, BP Cuff Size: Adult)   GEN:  A&O, NAD  HEENT:  head HC/AT, no visible goiter  PSYCH:  normal affect, non-anxious      Operative procedure patient was taken to the operating room after informed consent was obtained she is placed in supine position mask anesthesia was started without complication. She is prepped and draped in the normal sterile fashion and operative cystoscopy was placed inside  the bladder bowel surfaces were surveyed bilateral reflux was noted from the ureter orifices. A urethral inspection was then performed upon removal of the cystoscope the bladder was drained and refilled clear sterile water. In a fan like projection 0.5ml aliquots of diluted Botox were introduced into the bladder muscle. The flush was used to inject into the trigone. Total Botox use was 150U in approximately 20ml saline. Scant bleeding was noted from the urothelial tissue, the bladder was drained and patient was awoken from anesthesia and transferred to PACU in stable condition.    IMPRESSION/PLAN:    53-year-old status post repeat 150 units intradetrusor Botox     plan repeat in 6 months        Ruben Winston MD

## 2024-04-22 ENCOUNTER — OFFICE VISIT (OUTPATIENT)
Dept: UROLOGY | Facility: CLINIC | Age: 54
End: 2024-04-22
Payer: COMMERCIAL

## 2024-04-22 VITALS
WEIGHT: 164 LBS | SYSTOLIC BLOOD PRESSURE: 154 MMHG | HEART RATE: 61 BPM | BODY MASS INDEX: 26.36 KG/M2 | TEMPERATURE: 97.5 F | DIASTOLIC BLOOD PRESSURE: 83 MMHG | HEIGHT: 66 IN

## 2024-04-22 DIAGNOSIS — N20.0 NEPHROLITHIASIS: ICD-10-CM

## 2024-04-22 DIAGNOSIS — R35.0 URINARY FREQUENCY: Primary | ICD-10-CM

## 2024-04-22 DIAGNOSIS — R35.0 FREQUENCY OF MICTURITION: ICD-10-CM

## 2024-04-22 LAB
POC APPEARANCE, URINE: CLEAR
POC BILIRUBIN, URINE: NEGATIVE
POC BLOOD, URINE: NEGATIVE
POC COLOR, URINE: YELLOW
POC GLUCOSE, URINE: NEGATIVE MG/DL
POC KETONES, URINE: NEGATIVE MG/DL
POC LEUKOCYTES, URINE: NEGATIVE
POC NITRITE,URINE: NEGATIVE
POC PH, URINE: 7 PH
POC PROTEIN, URINE: NEGATIVE MG/DL
POC SPECIFIC GRAVITY, URINE: 1.02
POC UROBILINOGEN, URINE: 0.2 EU/DL

## 2024-04-22 PROCEDURE — 1036F TOBACCO NON-USER: CPT | Performed by: NURSE PRACTITIONER

## 2024-04-22 PROCEDURE — 81003 URINALYSIS AUTO W/O SCOPE: CPT | Performed by: NURSE PRACTITIONER

## 2024-04-22 PROCEDURE — 99214 OFFICE O/P EST MOD 30 MIN: CPT | Performed by: NURSE PRACTITIONER

## 2024-04-22 RX ORDER — POTASSIUM CITRATE 15 MEQ/1
15 TABLET, EXTENDED RELEASE ORAL DAILY
Qty: 90 TABLET | Refills: 3 | Status: SHIPPED | OUTPATIENT
Start: 2024-04-22 | End: 2025-04-22

## 2024-04-22 RX ORDER — MIRABEGRON 50 MG/1
50 TABLET, EXTENDED RELEASE ORAL DAILY
Qty: 28 TABLET | Refills: 0 | COMMUNITY
Start: 2024-04-22 | End: 2024-05-20

## 2024-04-22 NOTE — Clinical Note
Feels botox worn off, samples myrbetriq given, recommended follow up w Dr Winston to discuss plan sooner maybe could have botox sooner? She also is struggling w mood swings, wasn't sure if she was candidate for higher dose on patch or seeing her primary care for anxiety, on bupsar w therapy now; I instructed her to call your office to be seen sooner. Thanks,Tasneem

## 2024-04-22 NOTE — PATIENT INSTRUCTIONS
Plan:   Continue potassium citrate 15 meq once daily for now and lemon in water     Unable to take Magnesium low dose supplement w irritable bowels      MARIBELL and KUB, will discuss once results available  f/u 1 year kidney stones    Feels botox worn off, samples myrbetriq given, recommended follow up w Dr Winston to discuss plan sooner maybe could have botox sooner?    Discussed low desire, some anxiety managed w buspirone, mood swings, talk w Dr. Winston about that as well, perhaps the hormone could be increased, not my specialty or talk w primary care as maybe buspar not managing anxiety along w therapy she attends, lot of issues at home;     Discussed if needs something additional for desire, favorite resources [email or web address], ristella over the counter supplement by [employer];     Come back sooner if needs more help w joana     call Tasneem at [phone] sooner if any concerns on nonemergency nurse line

## 2024-04-22 NOTE — PROGRESS NOTES
04/22/24   [id number]    Chief Complaint   Patient presents with    Nephrolithiasis      Subjective      HPI Lili Cuevas is a 53 y.o. female who presents for follow up nephrolithiasis. Has done several litholink but can never increase fluids despite best efforts, potassium citrate 15 meq daily has helped w hypocitruria;     Last imaging April 2023 no stones noted on MARIBELL or xray;     UA negative tdoay, Botox recently in December as done every 6 mos w Dr. Winston; he also prescribes her patch combined hormonal therapy; dealing w anxiety w buspirone; not really helping; doing therapy now;        Imported from last notes: stone analysis revealed was 5-9 mm calcium oxalate stone patient passed  CT 5/13/21 show no stones, no hydronephrosis at this time, hypodensity noted in uterine cavity  MARIBELL and KUB 1/27/2022 no stones noted  microscopy 4/28/21 RBC 0-2     Noted April 2023, last litholink results reviewed again and requested scanned in chart. Urine citrate had risen to 586 and patient wants to continue potassium citrate once daily. She is aware urine volume is low and despite best efforts has not been able to improve urine volume, she doesn't feel value in repeating testing as she doesn't feel it will change.  Will continue w current plan.          Objective     There were no vitals taken for this visit.   Physical Exam  General: Appears comfortable and in no apparent distress, well nourished  Head: Normocephalic, atraumatic  Neck: trachea midline  Respiratory: respirations unlabored, no wheezes, and no use of accessory muscles  Cardiovascular: at rest no dyspnea, well perfused  Skin: no visible rashes or lesions  Neurologic: grossly intact, oriented to person, place, and time  Psychiatric: mood and affect appropriate  Musculoskeletal: in chair for appt. no difficulty w upper body movement    Assessment/Plan   Problem List Items Addressed This Visit    None    No orders of the defined types were placed in this  encounter.     Plan:   Continue potassium citrate 15 meq once daily for now and lemon in water     Unable to take Magnesium low dose supplement w irritable bowels      MARIBELL and KUB, will discuss once results available  f/u 1 year kidney stones    Feels botox worn off, samples myrbetriq given, recommended follow up w Dr Winston to discuss plan sooner maybe could have botox sooner?    Discussed low desire, some anxiety managed w buspirone, mood swings, talk w Dr. Winston about that as well, perhaps the hormone could be increased, not my specialty or talk w primary care as maybe buspar not managing anxiety along w therapy she attends, lot of issues at home;     Discussed if needs something additional for desire, favorite resources [email or web address], ristella over the counter supplement by [employer];     Come back sooner if needs more help w joana     call Tasneem at [phone] sooner if any concerns on nonemergency nurse line            Tasenem Luis, KURT-CNP  Lab Results   Component Value Date    GLUCOSE 89 12/09/2023    CALCIUM 9.2 12/09/2023     12/09/2023    K 3.9 12/09/2023    CO2 27 12/09/2023     12/09/2023    BUN 13 12/09/2023    CREATININE 0.83 12/09/2023

## 2024-05-02 ENCOUNTER — HOSPITAL ENCOUNTER (OUTPATIENT)
Dept: RADIOLOGY | Facility: HOSPITAL | Age: 54
Discharge: HOME | End: 2024-05-02
Payer: COMMERCIAL

## 2024-05-02 ENCOUNTER — APPOINTMENT (OUTPATIENT)
Dept: RADIOLOGY | Facility: HOSPITAL | Age: 54
End: 2024-05-02
Payer: COMMERCIAL

## 2024-05-02 DIAGNOSIS — R93.5 ABNORMAL X-RAY OF ABDOMEN: ICD-10-CM

## 2024-05-02 DIAGNOSIS — N20.0 NEPHROLITHIASIS: ICD-10-CM

## 2024-05-02 PROCEDURE — 76770 US EXAM ABDO BACK WALL COMP: CPT

## 2024-05-02 PROCEDURE — 76770 US EXAM ABDO BACK WALL COMP: CPT | Performed by: RADIOLOGY

## 2024-05-02 PROCEDURE — 74018 RADEX ABDOMEN 1 VIEW: CPT

## 2024-05-02 PROCEDURE — 74018 RADEX ABDOMEN 1 VIEW: CPT | Performed by: RADIOLOGY

## 2024-05-06 ENCOUNTER — TELEPHONE (OUTPATIENT)
Dept: UROLOGY | Facility: CLINIC | Age: 54
End: 2024-05-06
Payer: COMMERCIAL

## 2024-05-06 DIAGNOSIS — N39.41 URGE INCONTINENCE OF URINE: ICD-10-CM

## 2024-05-06 RX ORDER — ONABOTULINUMTOXINA 200 [USP'U]/1
INJECTION, POWDER, LYOPHILIZED, FOR SOLUTION INTRADERMAL; INTRAMUSCULAR
Qty: 1 EACH | Refills: 3 | Status: SHIPPED | OUTPATIENT
Start: 2024-05-06

## 2024-05-06 NOTE — TELEPHONE ENCOUNTER
----- Message from MARIELA Weems sent at 5/6/2024  9:27 AM EDT -----  MARIBELL and SANDRA looked good, ty  ----- Message -----  From: Interface, Radiology Results In  Sent: 5/4/2024   9:17 AM EDT  To: MARIELA Weems

## 2024-05-06 NOTE — TELEPHONE ENCOUNTER
----- Message from MARIELA Weems sent at 5/6/2024  9:26 AM EDT -----  MARIBELL looked good.   ----- Message -----  From: Interface, Radiology Results In  Sent: 5/4/2024   8:15 AM EDT  To: MARIELA Weems

## 2024-05-13 ENCOUNTER — APPOINTMENT (OUTPATIENT)
Dept: OBSTETRICS AND GYNECOLOGY | Facility: CLINIC | Age: 54
End: 2024-05-13
Payer: COMMERCIAL

## 2024-06-06 ENCOUNTER — PROCEDURE VISIT (OUTPATIENT)
Dept: OBSTETRICS AND GYNECOLOGY | Facility: CLINIC | Age: 54
End: 2024-06-06
Payer: COMMERCIAL

## 2024-06-06 VITALS — WEIGHT: 166.5 LBS | BODY MASS INDEX: 26.87 KG/M2 | DIASTOLIC BLOOD PRESSURE: 80 MMHG | SYSTOLIC BLOOD PRESSURE: 130 MMHG

## 2024-06-06 DIAGNOSIS — Z12.31 ENCOUNTER FOR SCREENING MAMMOGRAM FOR MALIGNANT NEOPLASM OF BREAST: ICD-10-CM

## 2024-06-06 DIAGNOSIS — N39.41 URGE INCONTINENCE OF URINE: ICD-10-CM

## 2024-06-06 DIAGNOSIS — N90.5 VULVAR ATROPHY: ICD-10-CM

## 2024-06-06 DIAGNOSIS — K59.02 SPASTIC PELVIC FLOOR SYNDROME: Primary | ICD-10-CM

## 2024-06-06 LAB
POC APPEARANCE, URINE: CLEAR
POC BILIRUBIN, URINE: NEGATIVE
POC BLOOD, URINE: ABNORMAL
POC COLOR, URINE: YELLOW
POC GLUCOSE, URINE: NEGATIVE MG/DL
POC KETONES, URINE: NEGATIVE MG/DL
POC LEUKOCYTES, URINE: ABNORMAL
POC NITRITE,URINE: NEGATIVE
POC PH, URINE: 7 PH
POC PROTEIN, URINE: NEGATIVE MG/DL
POC SPECIFIC GRAVITY, URINE: 1.02
POC UROBILINOGEN, URINE: 0.2 EU/DL

## 2024-06-06 PROCEDURE — 81003 URINALYSIS AUTO W/O SCOPE: CPT | Performed by: OBSTETRICS & GYNECOLOGY

## 2024-06-06 PROCEDURE — 52287 CYSTOSCOPY CHEMODENERVATION: CPT | Performed by: OBSTETRICS & GYNECOLOGY

## 2024-06-06 PROCEDURE — 87086 URINE CULTURE/COLONY COUNT: CPT

## 2024-06-06 RX ORDER — TIZANIDINE 2 MG/1
2 TABLET ORAL NIGHTLY
Qty: 30 TABLET | Refills: 2 | Status: SHIPPED | OUTPATIENT
Start: 2024-06-06 | End: 2024-09-04

## 2024-06-06 RX ORDER — LIDOCAINE HYDROCHLORIDE 20 MG/ML
1 JELLY TOPICAL ONCE
Status: COMPLETED | OUTPATIENT
Start: 2024-06-06 | End: 2024-06-06

## 2024-06-06 RX ORDER — ESTRADIOL 0.1 MG/G
CREAM VAGINAL
Qty: 34 G | Refills: 3 | Status: SHIPPED | OUTPATIENT
Start: 2024-06-06

## 2024-06-06 RX ADMIN — LIDOCAINE HYDROCHLORIDE 1 APPLICATION: 20 JELLY TOPICAL at 11:39

## 2024-06-06 ASSESSMENT — PATIENT HEALTH QUESTIONNAIRE - PHQ9
2. FEELING DOWN, DEPRESSED OR HOPELESS: NOT AT ALL
1. LITTLE INTEREST OR PLEASURE IN DOING THINGS: NOT AT ALL
SUM OF ALL RESPONSES TO PHQ9 QUESTIONS 1 & 2: 0

## 2024-06-07 LAB — BACTERIA UR CULT: NORMAL

## 2024-06-19 NOTE — PROGRESS NOTES
"GYN PROGRESS NOTE          Chief complaint: Urge incontinence    HPI:  Patient answers are not available for this visit.  HPI       botox injections     Additional comments: Est pt  Chaperone Liseth/ student             Comments    Lili is here today as an EST patient. She is a 52 yo patient here for a Cystoscopy with Intradetrusor Botox.  Her last visit was  12/20/2023  The procedure was explained by Liseth MCLEOD and Dr. Winston  Consent was signed.  The patient had no questions or concerns at this time.  Time out was completed.  Botox 200 Units \"office supply\"  I/O UA obtained see results.   LOT [id number]  EXP  07/2026    Chaperone Liseth MCLEOD  Discharge instructions were reviewed.  The patient did not have any questions or concerns at this time. A follow up was scheduled.  Having increased pain in left side unsure if its bladder or pelvic floor          Last edited by Liseth Barnett MA on 6/6/2024 11:34 AM.            ROS:  GEN - no fevers or chills  RESP - no SOB or cough  GYN - see HPI      HISTORY:  Past Medical History:   Diagnosis Date    Anesthesia of skin 03/15/2021    Numbness and tingling    Anxiety     Arthritis     Chronic headaches     Disease of digestive system, unspecified 03/15/2021    Digestive problems    Kidney stone 7/1/20    Migraine     Other specified personal risk factors, not elsewhere classified 03/15/2021    History of complications due to general anesthesia    Overactive bladder     Personal history of other diseases of the musculoskeletal system and connective tissue 03/15/2021    History of arthritis    PONV (postoperative nausea and vomiting)     Rh incompatibility 1/14/92    Unspecified visual loss 03/15/2021    Vision problems    Urinary tract infection     Vision loss     Wears contact lenses     instructed to remove contacts prior to procedure     Past Surgical History:   Procedure Laterality Date    BREAST BIOPSY      BREAST SURGERY  08/08/2018    Breast Surgery    COLONOSCOPY      " CYSTOSCOPY      DILATION AND CURETTAGE OF UTERUS  2018    Dilation And Curettage Of Cervical Stump    ENDOMETRIAL ABLATION      INCISIONAL BREAST BIOPSY  2016    Incisional Breast Biopsy    KNEE SURGERY  2018    Knee Surgery- arthroscopic    OTHER SURGICAL HISTORY  2021    Injection of botulinum toxin type A    UPPER GASTROINTESTINAL ENDOSCOPY      WRIST SURGERY Left      Social History     Socioeconomic History    Marital status:      Spouse name: Not on file    Number of children: Not on file    Years of education: Not on file    Highest education level: Not on file   Occupational History    Not on file   Tobacco Use    Smoking status: Former     Current packs/day: 0.00     Average packs/day: 0.3 packs/day for 40.0 years (10.0 ttl pk-yrs)     Types: Cigarettes     Start date:      Quit date:      Years since quittin.4    Smokeless tobacco: Never   Vaping Use    Vaping status: Never Used   Substance and Sexual Activity    Alcohol use: Yes     Alcohol/week: 3.0 standard drinks of alcohol     Types: 3 Standard drinks or equivalent per week     Comment: socially    Drug use: Never    Sexual activity: Yes     Partners: Male     Birth control/protection: Male Sterilization     Comment: LMP  ; h/o uterine ablation   Other Topics Concern    Not on file   Social History Narrative    Not on file     Social Determinants of Health     Financial Resource Strain: Low Risk  (2023)    Received from [facility]    Overall Financial Resource Strain (CARDIA)     Difficulty of Paying Living Expenses: Not hard at all   Food Insecurity: No Food Insecurity (2023)    Received from [facility]    Hunger Vital Sign     Worried About Running Out of Food in the Last Year: Never true     Ran Out of Food in the Last Year: Never true   Transportation Needs: Unknown (2023)    Received from [facility]    PRAPARE -  Transportation     Lack of Transportation (Medical): Not on file     Lack of Transportation (Non-Medical): No   Physical Activity: Not on file   Stress: Not on file   Social Connections: Not on file   Intimate Partner Violence: Not on file   Housing Stability: Unknown (12/2/2023)    Received from [facility]    Housing Stability Vital Sign     Unable to Pay for Housing in the Last Year: Not on file     Number of Places Lived in the Last Year: Not on file     Unstable Housing in the Last Year: No     Cancer-related family history includes Cancer in her paternal grandfather; Colon cancer in her paternal grandfather.       PHYSICAL EXAM:  /80   Wt 75.5 kg (166 lb 8 oz)   BMI 26.87 kg/m²   Physical examination:  General: No distress  Neck: No masses  Respiratory: No respiratory distress  GYN: Normal vulvar skin normal clitoral hernandez and clitoris labia majora and minora normal pink vaginal mucosa  perianal area: without lesions      Operative procedure: Operative cystoscopy intra-detrusor Botox injection  Dx: Urinary urgency    The patient was taken to the procedure room after informed consent was obtained.  Timeout was performed, birth date, patient name and procedure all discussed with the patient. She was placed in lithotomy position, half blade speculum placed in the posterior vagina to expose the urethra.  The urethra was prepped with Betadine.  1% lidocaine jelly was introduced to the urethral orifice. operative cystoscopy was placed inside the bladder surfaces were surveyed bilateral reflux was noted from the ureter orifices. A urethral inspection was then performed upon removal of the cystoscope from the bladder.  The cystoscope was reintroduced with the needle and in a fan like projection 0.5ml aliquots ofdiluted Botox were introduced into the bladder muscle. Total Botox use was 200U in approximately 10ml saline. Scant bleeding was noted from the urothelial tissue.  The scope was  removed from the patient's and the speculum was removed the patient tolerated the procedure well no complications bleeding minimal patient voiced understanding about postprocedure care.    IMPRESSION/PLAN:    Status post intradetrusor Botox 200u    Plan follow-up in 6 months for repeat procedure        Ruben Winston MD

## 2024-06-20 ENCOUNTER — APPOINTMENT (OUTPATIENT)
Dept: OBSTETRICS AND GYNECOLOGY | Facility: CLINIC | Age: 54
End: 2024-06-20
Payer: COMMERCIAL

## 2024-06-29 DIAGNOSIS — K59.02 SPASTIC PELVIC FLOOR SYNDROME: ICD-10-CM

## 2024-07-01 RX ORDER — TIZANIDINE 2 MG/1
2 TABLET ORAL NIGHTLY
Qty: 90 TABLET | Refills: 1 | Status: SHIPPED | OUTPATIENT
Start: 2024-07-01 | End: 2024-09-29

## 2024-07-03 DIAGNOSIS — B37.9 YEAST INFECTION: ICD-10-CM

## 2024-07-03 RX ORDER — METRONIDAZOLE 500 MG/1
500 TABLET ORAL 2 TIMES DAILY
Qty: 14 TABLET | Refills: 0 | Status: SHIPPED | OUTPATIENT
Start: 2024-07-03 | End: 2024-07-10

## 2024-07-10 DIAGNOSIS — B37.31 MONILIAL VULVITIS: ICD-10-CM

## 2024-07-10 RX ORDER — CLOTRIMAZOLE AND BETAMETHASONE DIPROPIONATE 10; .5 MG/ML; MG/ML
1 LOTION TOPICAL 2 TIMES DAILY
Qty: 30 ML | Refills: 0 | Status: SHIPPED | OUTPATIENT
Start: 2024-07-10 | End: 2024-07-17

## 2024-07-18 ENCOUNTER — TELEMEDICINE (OUTPATIENT)
Dept: OBSTETRICS AND GYNECOLOGY | Facility: CLINIC | Age: 54
End: 2024-07-18
Payer: COMMERCIAL

## 2024-07-18 DIAGNOSIS — N89.8 VAGINAL DISCHARGE: Primary | ICD-10-CM

## 2024-07-18 PROCEDURE — 99442 PR PHYS/QHP TELEPHONE EVALUATION 11-20 MIN: CPT | Performed by: ADVANCED PRACTICE MIDWIFE

## 2024-07-18 RX ORDER — FLUCONAZOLE 150 MG/1
150 TABLET ORAL DAILY
Qty: 3 TABLET | Refills: 2 | Status: SHIPPED | OUTPATIENT
Start: 2024-07-18 | End: 2024-07-21

## 2024-07-18 NOTE — PROGRESS NOTES
GYNECOLOGY VIRTUAL VISIT PROGRESS NOTE          CC:   No chief complaint on file.   Patient talked with via telephone only. Total time 11minutes on the phone with this patient. Patient with extensive story. Patient was seen by her PCP and was  tested and  treated for +yeast. She had itching and burning but no real discharge. She was given another oral medication for a week then ojlynn 7/10 she was given Lotrisone to use. She has been using topical estrogen cream and the Lotrisone as directed. She still continued to have vaginal burning and went back to her PCP and was tested again and was told she had yeast again but to follow up with gyn for further treatment. Patient feels that all treatments have helped some but not completely helped. No changes in soaps or hygiene products. She was made aware that she needs to be evaluated by gyn in person.     HPI:  Lili ABDUL Cuevas is scheduled today for virtual visit   Audio communication real-time was utilized and verbal consent was obtained for the encounter.        ROS:  GYN - see HPI      PHYSICAL EXAM:  VS:  n/a (virtual visit)  GEN:  A&O, NAD  PSYCH:  normal affect, non-anxious      IMPRESSION/PLAN:    A: vaginal irritation and burning for 3+weeks since 6/25/24. Rxs used so far have not helped completely clear the itching and burning for this patient.   Plan: 1. Continue to use daily estrogen cream x 4weeks then go to 1-2 times a week. 2. Rx sent in for Diflucan 150mg every day x 3 doses. 3. Follow up in person for cultures and evaluation of the labia and vaginal to rule out atrophy verses lichens or an infection.     Problem List Items Addressed This Visit    None         NAPOLEON Woods

## 2024-08-04 ASSESSMENT — ENCOUNTER SYMPTOMS
FREQUENCY: 1
FLANK PAIN: 0
HEMATURIA: 0
HEADACHES: 0
FEVER: 0
CHILLS: 0
NAUSEA: 0
VOMITING: 0
CONSTIPATION: 0
ANOREXIA: 0
BACK PAIN: 0
DYSURIA: 0
SORE THROAT: 1
DIARRHEA: 0
ABDOMINAL PAIN: 0

## 2024-08-05 ENCOUNTER — APPOINTMENT (OUTPATIENT)
Dept: OBSTETRICS AND GYNECOLOGY | Facility: CLINIC | Age: 54
End: 2024-08-05
Payer: COMMERCIAL

## 2024-08-05 VITALS
BODY MASS INDEX: 27.29 KG/M2 | DIASTOLIC BLOOD PRESSURE: 76 MMHG | WEIGHT: 169.8 LBS | SYSTOLIC BLOOD PRESSURE: 159 MMHG | HEIGHT: 66 IN

## 2024-08-05 DIAGNOSIS — N89.8 VAGINAL DISCHARGE: Primary | ICD-10-CM

## 2024-08-05 DIAGNOSIS — R39.9 UTI SYMPTOMS: ICD-10-CM

## 2024-08-05 PROCEDURE — 87086 URINE CULTURE/COLONY COUNT: CPT

## 2024-08-05 PROCEDURE — 3008F BODY MASS INDEX DOCD: CPT | Performed by: ADVANCED PRACTICE MIDWIFE

## 2024-08-05 PROCEDURE — 87205 SMEAR GRAM STAIN: CPT

## 2024-08-05 PROCEDURE — 99212 OFFICE O/P EST SF 10 MIN: CPT | Performed by: ADVANCED PRACTICE MIDWIFE

## 2024-08-05 NOTE — PROGRESS NOTES
"GYNECOLOGY PROGRESS NOTE        CC:  see below. Patient had cultures vaginal and urine cultures done by her PCP and she had a yeast infection and an UTI with 2 different bacteria's involved and was treated for both the UTI and yeast. Patient doesn't have any discharge at this time. Has been using topical estrogen cream every day now for about 3 weeks. Patient also sees.  for bladder botox. Patient would like vaginal cx and urine cx done today to make sure that both infections are gone.   Chief Complaint   Patient presents with    Follow-up     Est pt visit.   Patient had a yeast infection and a uti.   Wants to make sure everything is cleared up.         HPI:  Lili Cuevas is her with no complaint of discharge today or UTI symptoms but she was recently treated based on cultures d one buy her PCP for a UTI and a yeast infection.   She denies any new sexual partners or new soaps or detergents.    ROS:  GYN - see HPI        PHYSICAL EXAM:  /76 (BP Location: Right arm, Patient Position: Sitting)   Ht 1.676 m (5' 6\")   Wt 77 kg (169 lb 12.8 oz)   BMI 27.41 kg/m²   GEN:  A&O, NAD  URO:  normal urethra, bladder NT  GYN:  Some atrophy to the vulva and perineum is noted. No lesions or ulcers, Scant clear discharge noted.  PSYCH:  normal affect, non-anxious      IMPRESSION/PLAN:    A: clear vaginal discharge. Some atrophy to noted at the vaginal opening. NO UTI symptoms today but was treated for 2 bacterias found on the urine culture per patient.  Plan: 1. Vaginal cx. 2. Urine cx sent. 3. Continue topical estrogen daily for a total of 4 weeks and then 1-2 times per week for maintenance.     Problem List Items Addressed This Visit    None       Asuncion Valdez, KURT-CLIFFORD  "

## 2024-08-06 DIAGNOSIS — B37.9 YEAST INFECTION: Primary | ICD-10-CM

## 2024-08-06 LAB
CLUE CELLS VAG LPF-#/AREA: ABNORMAL /[LPF]
NUGENT SCORE: 1
YEAST VAG WET PREP-#/AREA: PRESENT

## 2024-08-06 RX ORDER — FLUCONAZOLE 150 MG/1
150 TABLET ORAL DAILY
Qty: 3 TABLET | Refills: 1 | Status: SHIPPED | OUTPATIENT
Start: 2024-08-06 | End: 2024-08-09

## 2024-08-07 LAB — BACTERIA UR CULT: NORMAL

## 2024-08-08 DIAGNOSIS — B37.9 CANDIDIASIS: Primary | ICD-10-CM

## 2024-08-08 RX ORDER — ASPIRIN 325 MG
1 TABLET, DELAYED RELEASE (ENTERIC COATED) ORAL DAILY
Qty: 12 G | Refills: 0 | Status: SHIPPED | OUTPATIENT
Start: 2024-08-08 | End: 2024-08-15

## 2024-08-21 ENCOUNTER — APPOINTMENT (OUTPATIENT)
Dept: OBSTETRICS AND GYNECOLOGY | Facility: CLINIC | Age: 54
End: 2024-08-21
Payer: COMMERCIAL

## 2024-08-21 VITALS — DIASTOLIC BLOOD PRESSURE: 80 MMHG | WEIGHT: 170 LBS | SYSTOLIC BLOOD PRESSURE: 142 MMHG | BODY MASS INDEX: 27.44 KG/M2

## 2024-08-21 DIAGNOSIS — N39.41 URGENCY INCONTINENCE: Primary | ICD-10-CM

## 2024-08-21 DIAGNOSIS — N90.5 VULVAR ATROPHY: ICD-10-CM

## 2024-08-21 PROCEDURE — 99214 OFFICE O/P EST MOD 30 MIN: CPT | Performed by: OBSTETRICS & GYNECOLOGY

## 2024-08-21 RX ORDER — ESTRADIOL 0.1 MG/G
CREAM VAGINAL
Qty: 34 G | Refills: 3 | Status: SHIPPED | OUTPATIENT
Start: 2024-08-21

## 2024-08-21 NOTE — PROGRESS NOTES
GYN PROGRESS NOTE          Chief complaint: follow up    HPI:  Patient answers are not available for this visit.  HPI       Follow-up     Additional comments: Est pt visit.              Comments    Patient states that she has had recurrent yeast infections.   She has also had 2 uti's this summer.  Patient stopped using the estrace cream. She did start it up again per BRYCE Valdez a couple weeks ago.   Patient is starting the 1-2 times a week dosage this week.   No discharge or pain today  Chaperone: Boni Andres MA            Last edited by Boni Andres MA on 8/21/2024  9:43 AM.          Reviewed case with patient, reviewed plans.    Discussed the risk benefits of daily estrogen cream use.  Recommend she start daily use.    Reviewed the need for a urine culture when symptoms arise.    All questions and concerns were addressed and answered.    ROS:  GEN - no fevers or chills  RESP - no SOB or cough  GYN - see HPI      HISTORY:  Past Medical History:   Diagnosis Date    Anesthesia of skin 03/15/2021    Numbness and tingling    Anxiety     Arthritis     Chronic headaches     Disease of digestive system, unspecified 03/15/2021    Digestive problems    Kidney stone 7/1/20    Migraine     Other specified personal risk factors, not elsewhere classified 03/15/2021    History of complications due to general anesthesia    Overactive bladder     Personal history of other diseases of the musculoskeletal system and connective tissue 03/15/2021    History of arthritis    PONV (postoperative nausea and vomiting)     Rh incompatibility 1/14/92    Unspecified visual loss 03/15/2021    Vision problems    Urinary tract infection     Vision loss     Wears contact lenses     instructed to remove contacts prior to procedure     Past Surgical History:   Procedure Laterality Date    BREAST BIOPSY      BREAST SURGERY  08/08/2018    Breast Surgery    COLONOSCOPY      CYSTOSCOPY      DILATION AND CURETTAGE OF UTERUS  08/08/2018    Dilation  And Curettage Of Cervical Stump    ENDOMETRIAL ABLATION      INCISIONAL BREAST BIOPSY  2016    Incisional Breast Biopsy    KNEE SURGERY  2018    Knee Surgery- arthroscopic    OTHER SURGICAL HISTORY  2021    Injection of botulinum toxin type A    UPPER GASTROINTESTINAL ENDOSCOPY      WRIST SURGERY Left      Social History     Socioeconomic History    Marital status:      Spouse name: Not on file    Number of children: Not on file    Years of education: Not on file    Highest education level: Not on file   Occupational History    Not on file   Tobacco Use    Smoking status: Former     Current packs/day: 0.00     Average packs/day: 0.3 packs/day for 40.0 years (10.0 ttl pk-yrs)     Types: Cigarettes     Start date:      Quit date:      Years since quittin.6    Smokeless tobacco: Never   Vaping Use    Vaping status: Never Used   Substance and Sexual Activity    Alcohol use: Yes     Alcohol/week: 3.0 standard drinks of alcohol     Types: 3 Standard drinks or equivalent per week     Comment: socially    Drug use: Never    Sexual activity: Yes     Partners: Male     Birth control/protection: Male Sterilization     Comment: LMP  ; h/o uterine ablation   Other Topics Concern    Not on file   Social History Narrative    Not on file     Social Determinants of Health     Financial Resource Strain: Low Risk  (2023)    Received from [facility]    Overall Financial Resource Strain (CARDIA)     Difficulty of Paying Living Expenses: Not hard at all   Food Insecurity: No Food Insecurity (2023)    Received from [facility]    Hunger Vital Sign     Worried About Running Out of Food in the Last Year: Never true     Ran Out of Food in the Last Year: Never true   Transportation Needs: Unknown (2023)    Received from [facility]    PRAPARE - Transportation     Lack of Transportation (Medical): Not on file     Lack of Transportation (Non-Medical): No   Physical Activity: Not on file   Stress: Not on file   Social Connections: Not on file   Intimate Partner Violence: Not on file   Housing Stability: Unknown (12/2/2023)    Received from [facility]    Housing Stability Vital Sign     Unable to Pay for Housing in the Last Year: Not on file     Number of Places Lived in the Last Year: Not on file     Unstable Housing in the Last Year: No     Cancer-related family history includes Cancer in her paternal grandfather; Colon cancer in her paternal grandfather.       PHYSICAL EXAM:  /80 (BP Location: Left arm, Patient Position: Sitting, BP Cuff Size: Adult)   Wt 77.1 kg (170 lb)   BMI 27.44 kg/m²   GEN:  A&O, NAD  HEENT:  head HC/AT, no visible goiter  PSYCH:  normal affect, non-anxious      IMPRESSION/PLAN:  53 y.o. vulvar atrophy and urgency incontinence.    - Estradiol vaginal cream  - Standing urine culture    Follow up in 2 months    Prema HIRSCH am scribing for virtually, and in the presence of Dr. Ruben Winston on  08/21/24 at 1:41 PM        Ruben Winston MD

## 2024-09-30 DIAGNOSIS — R23.2 FLUSHING: ICD-10-CM

## 2024-09-30 RX ORDER — ESTRADIOL/NORETHINDRONE ACETATE TRANSDERMAL SYSTEM .05; .14 MG/D; MG/D
PATCH, EXTENDED RELEASE TRANSDERMAL
Qty: 24 PATCH | Refills: 5 | Status: SHIPPED | OUTPATIENT
Start: 2024-09-30

## 2024-10-10 ENCOUNTER — HOSPITAL ENCOUNTER (OUTPATIENT)
Dept: RADIOLOGY | Facility: HOSPITAL | Age: 54
Discharge: HOME | End: 2024-10-10
Payer: COMMERCIAL

## 2024-10-10 DIAGNOSIS — Z12.31 ENCOUNTER FOR SCREENING MAMMOGRAM FOR MALIGNANT NEOPLASM OF BREAST: ICD-10-CM

## 2024-10-10 PROCEDURE — 77067 SCR MAMMO BI INCL CAD: CPT

## 2024-10-14 ENCOUNTER — APPOINTMENT (OUTPATIENT)
Dept: OBSTETRICS AND GYNECOLOGY | Facility: CLINIC | Age: 54
End: 2024-10-14
Payer: COMMERCIAL

## 2024-10-14 VITALS — WEIGHT: 171.4 LBS | BODY MASS INDEX: 27.66 KG/M2 | SYSTOLIC BLOOD PRESSURE: 114 MMHG | DIASTOLIC BLOOD PRESSURE: 78 MMHG

## 2024-10-14 DIAGNOSIS — N39.41 URGE INCONTINENCE OF URINE: ICD-10-CM

## 2024-10-14 LAB
POC APPEARANCE, URINE: CLEAR
POC BILIRUBIN, URINE: ABNORMAL
POC BLOOD, URINE: ABNORMAL
POC COLOR, URINE: YELLOW
POC GLUCOSE, URINE: NEGATIVE MG/DL
POC KETONES, URINE: NEGATIVE MG/DL
POC LEUKOCYTES, URINE: ABNORMAL
POC NITRITE,URINE: NEGATIVE
POC PH, URINE: 5.5 PH
POC PROTEIN, URINE: NEGATIVE MG/DL
POC SPECIFIC GRAVITY, URINE: >=1.03
POC UROBILINOGEN, URINE: 0.2 EU/DL
PREGNANCY TEST URINE, POC: NEGATIVE

## 2024-10-14 RX ORDER — LIDOCAINE HYDROCHLORIDE 20 MG/ML
1 JELLY TOPICAL ONCE
Status: COMPLETED | OUTPATIENT
Start: 2024-10-14 | End: 2024-10-14

## 2024-10-14 RX ORDER — POLYPODIUM LEUCOTOMOS EXTRACT 240 MG
CAPSULE ORAL
COMMUNITY

## 2024-10-14 ASSESSMENT — PAIN SCALES - GENERAL: PAINLEVEL_OUTOF10: 0 - NO PAIN

## 2024-10-14 NOTE — PROGRESS NOTES
"GYN PROGRESS NOTE          Chief complaint: Urge incontinence    HPI:  Patient answers are not available for this visit.  HPI    Lili is here today as an EST patient. She is a 53 yo patient here for a Cystoscopy with Intradetrusor Botox.  Her last visit was 6/6/2024  Today, she has complaints of urgency and frequency starting back up.   The procedure was explained by Brit Fink CMA and Dr. Winston  Consent was signed.  The patient had no questions or concerns at this time.  Doxycyline 100mg PO was not given- declined  Time out was completed.  Botox 100 Units \" supply\"  I/O UA obtained see results.   LOT   EXP    Chaperone Liseth RMA  Discharge instructions were reviewed.  The patient did not have any questions or concerns at this time. A follow up was scheduled.  Last edited by Brit Fink CMA on 10/14/2024 10:26 AM.            ROS:  GEN - no fevers or chills  RESP - no SOB or cough  GYN - see HPI      HISTORY:  Past Medical History:   Diagnosis Date    Anesthesia of skin 03/15/2021    Numbness and tingling    Anxiety     Arthritis     Chronic headaches     Disease of digestive system, unspecified 03/15/2021    Digestive problems    Kidney stone 7/1/20    Migraine     Other specified personal risk factors, not elsewhere classified 03/15/2021    History of complications due to general anesthesia    Overactive bladder     Personal history of other diseases of the musculoskeletal system and connective tissue 03/15/2021    History of arthritis    PONV (postoperative nausea and vomiting)     Rh incompatibility 1/14/92    Unspecified visual loss 03/15/2021    Vision problems    Urinary tract infection     Vision loss     Wears contact lenses     instructed to remove contacts prior to procedure     Past Surgical History:   Procedure Laterality Date    BREAST BIOPSY      BREAST SURGERY  08/08/2018    Breast Surgery    COLONOSCOPY      CYSTOSCOPY      DILATION AND CURETTAGE OF UTERUS  08/08/2018    Dilation " And Curettage Of Cervical Stump    ENDOMETRIAL ABLATION      INCISIONAL BREAST BIOPSY  2016    Incisional Breast Biopsy    KNEE SURGERY  2018    Knee Surgery- arthroscopic    OTHER SURGICAL HISTORY  2021    Injection of botulinum toxin type A    UPPER GASTROINTESTINAL ENDOSCOPY      WRIST SURGERY Left      Social History     Socioeconomic History    Marital status:      Spouse name: Not on file    Number of children: Not on file    Years of education: Not on file    Highest education level: Not on file   Occupational History    Not on file   Tobacco Use    Smoking status: Former     Current packs/day: 0.00     Average packs/day: 0.3 packs/day for 40.0 years (10.0 ttl pk-yrs)     Types: Cigarettes     Start date:      Quit date: 2019     Years since quittin.7    Smokeless tobacco: Never   Vaping Use    Vaping status: Never Used   Substance and Sexual Activity    Alcohol use: Yes     Alcohol/week: 3.0 standard drinks of alcohol     Types: 3 Standard drinks or equivalent per week     Comment: socially    Drug use: Never    Sexual activity: Yes     Partners: Male     Birth control/protection: Male Sterilization     Comment: LMP  ; h/o uterine ablation   Other Topics Concern    Not on file   Social History Narrative    Not on file     Social Determinants of Health     Financial Resource Strain: Low Risk  (2023)    Received from [facility]    Overall Financial Resource Strain (CARDIA)     Difficulty of Paying Living Expenses: Not hard at all   Food Insecurity: No Food Insecurity (2023)    Received from [facility]    Hunger Vital Sign     Worried About Running Out of Food in the Last Year: Never true     Ran Out of Food in the Last Year: Never true   Transportation Needs: Unknown (2023)    Received from [facility]    PRAPARE - Transportation     Lack of Transportation (Medical): Not on file     Lack of Transportation (Non-Medical): No   Physical Activity: Not on file   Stress: Not on file   Social Connections: Not on file   Intimate Partner Violence: Not on file   Housing Stability: Unknown (12/2/2023)    [facility]    Housing Stability Vital Sign     Unable to Pay for Housing in the Last Year: Not on file     Number of Places Lived in the Last Year: Not on file     Unstable Housing in the Last Year: No     Cancer-related family history includes Cancer in her paternal grandfather; Colon cancer in her paternal grandfather.       PHYSICAL EXAM:  /78   Wt 77.7 kg (171 lb 6.4 oz)   BMI 27.66 kg/m²   Physical examination:  General: No distress  Neck: No masses  Respiratory: No respiratory distress  GYN: Normal vulvar skin normal clitoral hernandez and clitoris labia majora and minora normal pink vaginal mucosa  perianal area: without lesions      Operative procedure: Operative cystoscopy intra-detrusor Botox injection  Dx: Urinary urgency    The patient was taken to the procedure room after informed consent was obtained.  Timeout was performed, birth date, patient name and procedure all discussed with the patient. She was placed in lithotomy position, half blade speculum placed in the posterior vagina to expose the urethra.  The urethra was prepped with Betadine.  1% lidocaine jelly was introduced to the urethral orifice. operative cystoscopy was placed inside the bladder surfaces were surveyed bilateral reflux was noted from the ureter orifices. A urethral inspection was then performed upon removal of the cystoscope from the bladder.  The cystoscope was reintroduced with the needle and in a fan like projection 0.5ml aliquots ofdiluted Botox were introduced into the bladder muscle. Total Botox use was 200U in approximately 10ml saline. Scant  bleeding was noted from the urothelial tissue.  The scope was removed from the patient's and the speculum was removed the patient tolerated the procedure well no complications bleeding minimal patient voiced understanding about postprocedure care.    IMPRESSION/PLAN:    Status post intradetrusor Botox 200U    Plan follow-up in 4 months for repeat procedure        Ruben Winston MD

## 2024-10-24 ENCOUNTER — LAB (OUTPATIENT)
Dept: LAB | Facility: LAB | Age: 54
End: 2024-10-24
Payer: COMMERCIAL

## 2024-10-24 ENCOUNTER — OFFICE VISIT (OUTPATIENT)
Dept: SURGICAL ONCOLOGY | Facility: HOSPITAL | Age: 54
End: 2024-10-24
Payer: COMMERCIAL

## 2024-10-24 VITALS
SYSTOLIC BLOOD PRESSURE: 128 MMHG | RESPIRATION RATE: 16 BRPM | BODY MASS INDEX: 27.32 KG/M2 | HEIGHT: 66 IN | WEIGHT: 170 LBS | HEART RATE: 72 BPM | DIASTOLIC BLOOD PRESSURE: 77 MMHG

## 2024-10-24 DIAGNOSIS — N39.41 URGE INCONTINENCE OF URINE: ICD-10-CM

## 2024-10-24 DIAGNOSIS — R92.30 DENSE BREAST TISSUE: Primary | ICD-10-CM

## 2024-10-24 PROCEDURE — 87086 URINE CULTURE/COLONY COUNT: CPT

## 2024-10-24 PROCEDURE — 99203 OFFICE O/P NEW LOW 30 MIN: CPT | Performed by: NURSE PRACTITIONER

## 2024-10-24 PROCEDURE — 3008F BODY MASS INDEX DOCD: CPT | Performed by: NURSE PRACTITIONER

## 2024-10-24 PROCEDURE — 87186 SC STD MICRODIL/AGAR DIL: CPT

## 2024-10-24 PROCEDURE — 99213 OFFICE O/P EST LOW 20 MIN: CPT | Performed by: NURSE PRACTITIONER

## 2024-10-27 LAB — BACTERIA UR CULT: ABNORMAL

## 2025-02-04 RX ORDER — DOXYCYCLINE HYCLATE 100 MG
100 TABLET ORAL ONCE
Status: CANCELLED | OUTPATIENT
Start: 2025-02-04 | End: 2025-02-04

## 2025-02-06 ENCOUNTER — PROCEDURE VISIT (OUTPATIENT)
Dept: OBSTETRICS AND GYNECOLOGY | Facility: CLINIC | Age: 55
End: 2025-02-06
Payer: COMMERCIAL

## 2025-02-06 ENCOUNTER — APPOINTMENT (OUTPATIENT)
Dept: OBSTETRICS AND GYNECOLOGY | Facility: CLINIC | Age: 55
End: 2025-02-06
Payer: COMMERCIAL

## 2025-02-06 VITALS — DIASTOLIC BLOOD PRESSURE: 84 MMHG | SYSTOLIC BLOOD PRESSURE: 124 MMHG | WEIGHT: 170 LBS | BODY MASS INDEX: 27.44 KG/M2

## 2025-02-06 DIAGNOSIS — N39.41 URGE INCONTINENCE OF URINE: ICD-10-CM

## 2025-02-06 LAB
POC APPEARANCE, URINE: CLEAR
POC BILIRUBIN, URINE: NEGATIVE
POC BLOOD, URINE: ABNORMAL
POC COLOR, URINE: YELLOW
POC GLUCOSE, URINE: NEGATIVE MG/DL
POC KETONES, URINE: NEGATIVE MG/DL
POC LEUKOCYTES, URINE: NEGATIVE
POC NITRITE,URINE: NEGATIVE
POC PH, URINE: 6.5 PH
POC PROTEIN, URINE: ABNORMAL MG/DL
POC SPECIFIC GRAVITY, URINE: 1.02
POC UROBILINOGEN, URINE: 0.2 EU/DL

## 2025-02-06 RX ORDER — LIDOCAINE HYDROCHLORIDE 20 MG/ML
1 JELLY TOPICAL ONCE
Status: COMPLETED | OUTPATIENT
Start: 2025-02-06 | End: 2025-02-06

## 2025-02-06 RX ADMIN — LIDOCAINE HYDROCHLORIDE 1 APPLICATION: 20 JELLY TOPICAL at 14:42

## 2025-02-06 ASSESSMENT — PATIENT HEALTH QUESTIONNAIRE - PHQ9
2. FEELING DOWN, DEPRESSED OR HOPELESS: NOT AT ALL
SUM OF ALL RESPONSES TO PHQ9 QUESTIONS 1 & 2: 0
1. LITTLE INTEREST OR PLEASURE IN DOING THINGS: NOT AT ALL

## 2025-02-10 ENCOUNTER — APPOINTMENT (OUTPATIENT)
Dept: OBSTETRICS AND GYNECOLOGY | Facility: CLINIC | Age: 55
End: 2025-02-10
Payer: COMMERCIAL

## 2025-02-13 ENCOUNTER — APPOINTMENT (OUTPATIENT)
Dept: OBSTETRICS AND GYNECOLOGY | Facility: CLINIC | Age: 55
End: 2025-02-13
Payer: COMMERCIAL

## 2025-02-13 NOTE — PROGRESS NOTES
GYN PROGRESS NOTE          Chief complaint: Urge incontinence    HPI:  Patient answers are not available for this visit.  HPI       Procedure     Additional comments: Cystoscopy with Intradetrusor 200 units Botox               Comments     Lili is a 54 year old Est patient here today for a Cystoscopy with Intradetrusor Botox 200 mg.  Her last visit was 10/14/24.    Today, Pt denies urinary symptoms.   The procedure was explained by RN and Dr. Winston  Consent was reviewed with, and signed by Pt.  Pt denies questions or concerns.       Time out was completed.  Botox 200 Units patient supplied  I/O UA obtained see results.   LOT: [id number]  EXP: 03/2027    Chaperoned by SIMON Sullivan               Last edited by Saima Vasquez RN on 2/6/2025  2:30 PM.            ROS:  GEN - no fevers or chills  RESP - no SOB or cough  GYN - see HPI      HISTORY:  Past Medical History:   Diagnosis Date    Anesthesia of skin 03/15/2021    Numbness and tingling    Anxiety     Arthritis     Chronic headaches     Disease of digestive system, unspecified 03/15/2021    Digestive problems    Kidney stone 7/1/20    Migraine     Other specified personal risk factors, not elsewhere classified 03/15/2021    History of complications due to general anesthesia    Overactive bladder     Personal history of other diseases of the musculoskeletal system and connective tissue 03/15/2021    History of arthritis    PONV (postoperative nausea and vomiting)     Rh incompatibility 1/14/92    Unspecified visual loss 03/15/2021    Vision problems    Urinary tract infection     Vision loss     Wears contact lenses     instructed to remove contacts prior to procedure     Past Surgical History:   Procedure Laterality Date    BREAST BIOPSY      BREAST SURGERY  08/08/2018    Breast Surgery    COLONOSCOPY      CYSTOSCOPY      DILATION AND CURETTAGE OF UTERUS  08/08/2018    Dilation And Curettage Of Cervical Stump    ENDOMETRIAL ABLATION      INCISIONAL BREAST  BIOPSY  2016    Incisional Breast Biopsy    KNEE SURGERY  2018    Knee Surgery- arthroscopic    OTHER SURGICAL HISTORY  2021    Injection of botulinum toxin type A    UPPER GASTROINTESTINAL ENDOSCOPY      WRIST SURGERY Left      Social History     Socioeconomic History    Marital status:      Spouse name: Not on file    Number of children: Not on file    Years of education: Not on file    Highest education level: Not on file   Occupational History    Not on file   Tobacco Use    Smoking status: Some Days     Current packs/day: 0.00     Average packs/day: 0.3 packs/day for 40.0 years (10.0 ttl pk-yrs)     Types: Cigarettes     Start date:      Last attempt to quit: 2019     Years since quittin.1    Smokeless tobacco: Never   Vaping Use    Vaping status: Never Used   Substance and Sexual Activity    Alcohol use: Yes     Alcohol/week: 3.0 standard drinks of alcohol     Types: 3 Standard drinks or equivalent per week     Comment: socially    Drug use: Never    Sexual activity: Yes     Partners: Male     Birth control/protection: Male Sterilization     Comment: LMP  ; h/o uterine ablation   Other Topics Concern    Not on file   Social History Narrative    Not on file     Social Drivers of Health     Financial Resource Strain: Low Risk  (2023)    Received from [facility] [email or web address] [facility]    Overall Financial Resource Strain (CARDIA)     Difficulty of Paying Living Expenses: Not hard at all   Food Insecurity: No Food Insecurity (2023)    Received from [facility] [employer] [facility] [facility]    Hunger Vital Sign     Worried About Running Out of Food in the Last Year: Never true     Ran Out of Food in the Last Year: Never true   Transportation Needs: Unknown (2023)    Received from [facility] [employer] [facility]    PRAPARE - Transportation     Lack of Transportation  (Medical): Not on file     Lack of Transportation (Non-Medical): No   Physical Activity: Not on file   Stress: Not on file   Social Connections: Not on file   Intimate Partner Violence: Not on file   Housing Stability: Unknown (12/2/2023)    Received from [facility]    Housing Stability Vital Sign     Unable to Pay for Housing in the Last Year: Not on file     Number of Places Lived in the Last Year: Not on file     Unstable Housing in the Last Year: No     Cancer-related family history includes Cancer in her paternal grandfather; Colon cancer in her paternal grandfather.       PHYSICAL EXAM:  /84   Wt 77.1 kg (170 lb)   BMI 27.44 kg/m²   Physical examination:  General: No distress  Neck: No masses  Respiratory: No respiratory distress  GYN: Normal vulvar skin normal clitoral hernandez and clitoris labia majora and minora normal pink vaginal mucosa  perianal area: without lesions      Operative procedure: Operative cystoscopy intra-detrusor Botox injection  Dx: Urinary urgency    The patient was taken to the procedure room after informed consent was obtained.  Timeout was performed, birth date, patient name and procedure all discussed with the patient. She was placed in lithotomy position, half blade speculum placed in the posterior vagina to expose the urethra.  The urethra was prepped with Betadine.  1% lidocaine jelly was introduced to the urethral orifice. operative cystoscopy was placed inside the bladder surfaces were surveyed bilateral reflux was noted from the ureter orifices. A urethral inspection was then performed upon removal of the cystoscope from the bladder.  The cystoscope was reintroduced with the needle and in a fan like projection 0.5ml aliquots ofdiluted Botox were introduced into the bladder muscle. Total Botox use was 200U in approximately 10ml saline. Scant bleeding was noted from the urothelial tissue.  The scope was removed from  the patient's and the speculum was removed the patient tolerated the procedure well no complications bleeding minimal patient voiced understanding about postprocedure care.    IMPRESSION/PLAN:    Status post intradetrusor Botox 200U    Plan follow-up in 4 months for repeat procedure        Ruben Winston MD

## 2025-02-14 DIAGNOSIS — N39.41 URGE INCONTINENCE OF URINE: ICD-10-CM

## 2025-02-19 LAB — BACTERIA UR CULT: NORMAL

## 2025-03-03 ENCOUNTER — TELEMEDICINE (OUTPATIENT)
Dept: OBSTETRICS AND GYNECOLOGY | Facility: CLINIC | Age: 55
End: 2025-03-03
Payer: COMMERCIAL

## 2025-03-03 ENCOUNTER — APPOINTMENT (OUTPATIENT)
Dept: OBSTETRICS AND GYNECOLOGY | Facility: CLINIC | Age: 55
End: 2025-03-03
Payer: COMMERCIAL

## 2025-03-03 DIAGNOSIS — R30.0 DYSURIA: Primary | ICD-10-CM

## 2025-03-03 NOTE — PROGRESS NOTES
Virtual or Telephone Consent    A telephone visit (audio only) between the patient (at the originating site) and the provider (at the distant site) was utilized to provide this telehealth service.   Verbal consent was requested and obtained on this date for a telehealth visit.     21min in length          Chief complaint: follow up    HPI: Had episode of dysuria had negative urine culture    Has seen this pattern before reports not having this powder under anesthesia    Not sure that they are related    Went to menopause class disgust hormonal testing discuss this as a sales maneuver not medicine is unclear what her cortisol level or other hormonal levels would do I recommend continuing on the current combination HRT as prescribed as her symptoms are well-controlled          ROS:  GEN - no fevers or chills  RESP - no SOB or cough  GYN - see HPI      HISTORY:  Past Medical History:   Diagnosis Date    Anesthesia of skin 03/15/2021    Numbness and tingling    Anxiety     Arthritis     Chronic headaches     Disease of digestive system, unspecified 03/15/2021    Digestive problems    Kidney stone 7/1/20    Migraine     Other specified personal risk factors, not elsewhere classified 03/15/2021    History of complications due to general anesthesia    Overactive bladder     Personal history of other diseases of the musculoskeletal system and connective tissue 03/15/2021    History of arthritis    PONV (postoperative nausea and vomiting)     Rh incompatibility 1/14/92    Unspecified visual loss 03/15/2021    Vision problems    Urinary tract infection     Vision loss     Wears contact lenses     instructed to remove contacts prior to procedure     Past Surgical History:   Procedure Laterality Date    BREAST BIOPSY      BREAST SURGERY  08/08/2018    Breast Surgery    COLONOSCOPY      CYSTOSCOPY      DILATION AND CURETTAGE OF UTERUS  08/08/2018    Dilation And Curettage Of Cervical Stump    ENDOMETRIAL ABLATION      INCISIONAL  BREAST BIOPSY  2016    Incisional Breast Biopsy    KNEE SURGERY  2018    Knee Surgery- arthroscopic    OTHER SURGICAL HISTORY  2021    Injection of botulinum toxin type A    UPPER GASTROINTESTINAL ENDOSCOPY      WRIST SURGERY Left      Social History     Socioeconomic History    Marital status:      Spouse name: Not on file    Number of children: Not on file    Years of education: Not on file    Highest education level: Not on file   Occupational History    Not on file   Tobacco Use    Smoking status: Some Days     Current packs/day: 0.00     Average packs/day: 0.3 packs/day for 40.0 years (10.0 ttl pk-yrs)     Types: Cigarettes     Start date:      Last attempt to quit: 2019     Years since quittin.1    Smokeless tobacco: Never   Vaping Use    Vaping status: Never Used   Substance and Sexual Activity    Alcohol use: Yes     Alcohol/week: 3.0 standard drinks of alcohol     Types: 3 Standard drinks or equivalent per week     Comment: socially    Drug use: Never    Sexual activity: Yes     Partners: Male     Birth control/protection: Male Sterilization     Comment: LMP  ; h/o uterine ablation   Other Topics Concern    Not on file   Social History Narrative    Not on file     Social Drivers of Health     Financial Resource Strain: Low Risk  (2023)    Received from [facility] [employer] [facility] [employer] [facility]    Overall Financial Resource Strain (CARDIA)     Difficulty of Paying Living Expenses: Not hard at all   Food Insecurity: No Food Insecurity (2023)    [facility] [employer] [facility]    Hunger Vital Sign     Worried About Running Out of Food in the Last Year: Never true     Ran Out of Food in the Last Year: Never true   Transportation Needs: Unknown (2023)    Received from [facility] [employer] [facility] [employer] [facility]    PRAPARE - Transportation     Lack of  Transportation (Medical): Not on file     Lack of Transportation (Non-Medical): No   Physical Activity: Not on file   Stress: Not on file   Social Connections: Not on file   Intimate Partner Violence: Not on file   Housing Stability: Unknown (12/2/2023)    Received from [facility]    Housing Stability Vital Sign     Unable to Pay for Housing in the Last Year: Not on file     Number of Places Lived in the Last Year: Not on file     Unstable Housing in the Last Year: No     Cancer-related family history includes Cancer in her paternal grandfather; Colon cancer in her paternal grandfather.       IMPRESSION/PLAN:    54-year-old urinary urgency episode of dysuria after Botox negative culture    Plan repeat 200 units intradetrusor Botox versus InterStim at 4 months    Uribel if needed for dyuria          Ruben Winston MD

## 2025-03-10 ENCOUNTER — HOSPITAL ENCOUNTER (OUTPATIENT)
Dept: RADIOLOGY | Facility: CLINIC | Age: 55
Discharge: HOME | End: 2025-03-10
Payer: COMMERCIAL

## 2025-03-10 ENCOUNTER — OFFICE VISIT (OUTPATIENT)
Dept: ORTHOPEDIC SURGERY | Facility: CLINIC | Age: 55
End: 2025-03-10
Payer: COMMERCIAL

## 2025-03-10 DIAGNOSIS — M25.552 HIP PAIN, LEFT: ICD-10-CM

## 2025-03-10 DIAGNOSIS — S39.012A LOW BACK STRAIN, INITIAL ENCOUNTER: Primary | ICD-10-CM

## 2025-03-10 PROCEDURE — 73502 X-RAY EXAM HIP UNI 2-3 VIEWS: CPT | Mod: LT

## 2025-03-10 PROCEDURE — 99214 OFFICE O/P EST MOD 30 MIN: CPT | Performed by: ORTHOPAEDIC SURGERY

## 2025-03-10 PROCEDURE — 73502 X-RAY EXAM HIP UNI 2-3 VIEWS: CPT | Mod: LEFT SIDE | Performed by: ORTHOPAEDIC SURGERY

## 2025-03-10 PROCEDURE — 99213 OFFICE O/P EST LOW 20 MIN: CPT | Performed by: ORTHOPAEDIC SURGERY

## 2025-03-10 NOTE — PROGRESS NOTES
History: Lili is here for her left  hip. She is an established patient. She was on vacation 2 weeks ago in [place] when her hip started bothering her. At the time she was unable to go from sitting to standing. It has not happened since then or prior to this event. She does continue to have soreness. She denies numbness and tingling down the leg.  She does have a history of low back issues and is seeing her chiropractor in the past for flareups.  Her left CMC joint does bother her on occasion.     Past medical history: Multiple  Medications: Multiple  Allergies: No known drug allergies    Please refer to the intake H&P regarding the patient's review of systems, family history and social history as was done today    HEENT: Normal  Lungs: Clear to auscultation  Heart: RRR  Abdomen: Soft, nontender  Skin: clear  Extremity: On exam she has mild tenderness remaining in the left sciatic notch and posterior pelvis.  No pain over the IT band.  No pain with hip rotation maneuvers.  Good strength distally.  No numbness or tingling.  Contralateral exam is normal for strength, motion, stability and neurovascular assessment.    Radiographs: XR of the left hip today are essentially normal.     Assessment: Low back strain     Plan: We discussed several options for treatment. Upon exam, I feel that this is coming from her low back. She has a history of a pinched nerve in her lumbar spine. She can work on therapy exercises and see her chiropractor for treatment.  She has some diffuse hand pain after prior CMC arthroplasty and understand she may be getting some arthritic changes throughout the remainder of the carpus.  Her basal joint function is excellent with good  strength.  We can obtain new x-rays if having continued difficulties over the next few months.  We will have her follow-up as needed.   All questions were answered today with the patient.    Scribe Attestation  By signing my name below, I, Sarahi Salinas,  Scribe   attest that this documentation has been prepared under the direction and in the presence of Anuj Thomas MD.

## 2025-03-19 ENCOUNTER — APPOINTMENT (OUTPATIENT)
Dept: UROLOGY | Facility: CLINIC | Age: 55
End: 2025-03-19
Payer: COMMERCIAL

## 2025-03-19 DIAGNOSIS — N20.0 NEPHROLITHIASIS: ICD-10-CM

## 2025-03-19 DIAGNOSIS — E66.9 OBESITY, UNSPECIFIED CLASS, UNSPECIFIED OBESITY TYPE, UNSPECIFIED WHETHER SERIOUS COMORBIDITY PRESENT: ICD-10-CM

## 2025-03-19 DIAGNOSIS — R35.0 URINARY FREQUENCY: Primary | ICD-10-CM

## 2025-03-19 PROCEDURE — 99213 OFFICE O/P EST LOW 20 MIN: CPT | Performed by: NURSE PRACTITIONER

## 2025-03-19 NOTE — PROGRESS NOTES
03/19/25   [id number]    Discussion lab results, 3rd line OAB options and menopause    Virtual or Telephone Consent    While technically available, the patient was unable or unwilling to consent to connect via audio/video telehealth technology; therefore, I performed this visit using a real-time audio only connection between Lili Cuevas & KURT Weems-CNP.  Verbal consent was requested and obtained from Llii Cuevas on this date, 03/19/25 for a telehealth visit and the patient's location was confirmed at the time of the visit.       Subjective      HPI Lili Cuevas is a 54 y.o. female who presents for discussion of lab results, plan of care. Recently with botox, per patient trace heme noted in urine; Wanted to further discuss all of that and menopause;      Urine with trace heme, concerned about evaluation; discussed in past urine 0-2 rbc/hpf except once a little more but appeared contaminated with vaginal cells and was also passing a very large kidney stone at the time in 2021; has had the work up regardless with imaging and frequent cystoscopy done for botox;     Per patient, has been recommended sacral neuromodulation and wanted second opinion. Per patient, feels that she doesn't feel well for couple weeks after botox past few years. Now getting 200 units per patient; Would like to avoid those types of feelings but had been reluctant about implant. Discussed all 3rd line options, will send info on Revi and Axonics;    Thirdly, she wanted to talk about menopause, recently attended menopause event in [place]; they recommended testing of hormones; but she was also told not necessary; she wanted to know where she was at. On combi-patch from Dr. Winston;     No hot flashes, some brain fog, some weight gain;     Referral to weight management; discussed lab work is not done for menopause as her levels could change daily and she should be treated on her symptoms alone as current recommendations of  national menopause organizations recommend.       Last seen 4/22/24  follow up nephrolithiasis. Has done several litholink but can never increase fluids despite best efforts, potassium citrate 15 meq daily has helped w hypocitruria;     Last imaging April 2023 no stones noted on MARIBELL or xray;     10/24/24 Ecoli + UTI  8/5/24 neg for infection in urine, + yeast vagina  4/22/24 UA neg  6/24/23 Enterococcus faecalis + urine culture  12/31/22 urine micro 1 rbc/hpf  4/28/21 rbc 0-2/hpf  2/5/21 urine micro rbc 3/hpf, squamous ep 14    Passed large renal stone 5-9 mm calcium oxalate in Spring 2021  Imported from last notes: stone analysis revealed was 5-9 mm calcium oxalate stone patient passed    CT 5/13/21 show no stones, no hydronephrosis at this time, hypodensity noted in uterine cavity      MARIBELL and KUB 1/27/2022 no stones noted    5/4/24 MARIBELL  RIGHT KIDNEY:   Right kidney measures  10.2 cm.  No shadowing calculus or right   hydronephrosis is visualized.  No discrete renal lesion is visualized.   LEFT KIDNEY:   Left kidney measures  9.6 cm.  No shadowing calculus or left   hydronephrosis is visualized.  No discrete renal lesion is visualized.   BLADDER:   Distended bladder has a calculated volume of  361 mL.   No intraluminal mass, wall thickening or shadowing calculus is   visualized.   Ureteral jets are demonstrated with color Doppler imaging.   IMPRESSION:   No hydronephrosis bilaterally.   No focal urinary bladder abnormality identified.        Noted April 2023, last litholink results reviewed again and requested scanned in chart. Urine citrate had risen to 586 and patient wants to continue potassium citrate once daily. She is aware urine volume is low and despite best efforts has not been able to improve urine volume, she doesn't feel value in repeating testing as she doesn't feel it will change.  Will continue w current plan.          Objective     There were no vitals taken for this visit.   Physical Exam  Could  not connect for virtual    Assessment/Plan   Problem List Items Addressed This Visit    None  Visit Diagnoses       Urinary frequency    -  Primary    Obesity, unspecified class, unspecified obesity type, unspecified whether serious comorbidity present        Relevant Orders    Referral to Endocrinology    Nephrolithiasis              Orders Placed This Encounter   Procedures    Referral to Endocrinology     Standing Status:   Future     Standing Expiration Date:   3/19/2026     Referral Priority:   Routine     Referral Type:   Consultation     Referral Reason:   Specialty Services Required     Referred to Provider:   Etienne De Oliveira MD     Requested Specialty:   Endocrinology     Number of Visits Requested:   1      Plan:   Urine is normal range for hematuria but regardless has had work up for hematuria as part of botox with frequent cystoscopy and with imaging done for kidney stones and monitoring;     Discussed third line options, info on Revi and Axonics sent to patient    Referral for weight management with endocrinology placed.     Follow up 6 mos virtual is fine, sooner if desires to talk further about procedure in information sent; otherwise working with Dr. Winston for botox and consideration of interstim.    Nurse line [phone]            Tasneem Luis, KURT-CNP  Lab Results   Component Value Date    GLUCOSE 89 12/09/2023    CALCIUM 9.2 12/09/2023     12/09/2023    K 3.9 12/09/2023    CO2 27 12/09/2023     12/09/2023    BUN 13 12/09/2023    CREATININE 0.83 12/09/2023

## 2025-03-31 DIAGNOSIS — R39.9 UTI SYMPTOMS: ICD-10-CM

## 2025-04-02 DIAGNOSIS — N30.00 ACUTE CYSTITIS WITHOUT HEMATURIA: Primary | ICD-10-CM

## 2025-04-02 RX ORDER — NITROFURANTOIN 25; 75 MG/1; MG/1
100 CAPSULE ORAL 2 TIMES DAILY
Qty: 10 CAPSULE | Refills: 0 | Status: SHIPPED | OUTPATIENT
Start: 2025-04-02 | End: 2025-04-07

## 2025-04-03 LAB — BACTERIA UR CULT: ABNORMAL

## 2025-04-05 DIAGNOSIS — N39.41 URGE INCONTINENCE OF URINE: ICD-10-CM

## 2025-04-07 ENCOUNTER — HOSPITAL ENCOUNTER (OUTPATIENT)
Dept: RADIOLOGY | Facility: HOSPITAL | Age: 55
Discharge: HOME | End: 2025-04-07

## 2025-04-07 DIAGNOSIS — R92.30 DENSE BREAST TISSUE: ICD-10-CM

## 2025-04-07 PROCEDURE — 6100000003 BI MR BREAST BILATERAL WITH CONTRAST FAST SCREENING SELF PAY

## 2025-04-07 PROCEDURE — A9575 INJ GADOTERATE MEGLUMI 0.1ML: HCPCS | Performed by: NURSE PRACTITIONER

## 2025-04-07 PROCEDURE — 2550000001 HC RX 255 CONTRASTS: Performed by: NURSE PRACTITIONER

## 2025-04-07 RX ORDER — GADOTERATE MEGLUMINE 376.9 MG/ML
15 INJECTION INTRAVENOUS
Status: COMPLETED | OUTPATIENT
Start: 2025-04-07 | End: 2025-04-07

## 2025-04-07 RX ADMIN — GADOTERATE MEGLUMINE 15 ML: 376.9 INJECTION INTRAVENOUS at 12:22

## 2025-04-08 RX ORDER — ONABOTULINUMTOXINA 200 [USP'U]/1
INJECTION, POWDER, LYOPHILIZED, FOR SOLUTION INTRADERMAL; INTRAMUSCULAR
Qty: 1 EACH | Refills: 3 | Status: SHIPPED | OUTPATIENT
Start: 2025-04-08

## 2025-04-15 ENCOUNTER — OFFICE VISIT (OUTPATIENT)
Dept: UROLOGY | Facility: CLINIC | Age: 55
End: 2025-04-15
Payer: COMMERCIAL

## 2025-04-15 VITALS — DIASTOLIC BLOOD PRESSURE: 89 MMHG | HEART RATE: 80 BPM | TEMPERATURE: 97.6 F | SYSTOLIC BLOOD PRESSURE: 156 MMHG

## 2025-04-15 DIAGNOSIS — R35.0 URINARY FREQUENCY: ICD-10-CM

## 2025-04-15 DIAGNOSIS — N20.0 NEPHROLITHIASIS: Primary | ICD-10-CM

## 2025-04-15 LAB
POC APPEARANCE, URINE: CLEAR
POC BILIRUBIN, URINE: NEGATIVE
POC BLOOD, URINE: ABNORMAL
POC COLOR, URINE: YELLOW
POC GLUCOSE, URINE: NEGATIVE MG/DL
POC KETONES, URINE: NEGATIVE MG/DL
POC LEUKOCYTES, URINE: ABNORMAL
POC NITRITE,URINE: NEGATIVE
POC PH, URINE: 6 PH
POC PROTEIN, URINE: NEGATIVE MG/DL
POC SPECIFIC GRAVITY, URINE: 1.01
POC UROBILINOGEN, URINE: 0.2 EU/DL

## 2025-04-15 PROCEDURE — 99214 OFFICE O/P EST MOD 30 MIN: CPT | Performed by: NURSE PRACTITIONER

## 2025-04-15 PROCEDURE — 81003 URINALYSIS AUTO W/O SCOPE: CPT | Performed by: NURSE PRACTITIONER

## 2025-04-15 PROCEDURE — 51798 US URINE CAPACITY MEASURE: CPT | Performed by: NURSE PRACTITIONER

## 2025-04-15 ASSESSMENT — PATIENT HEALTH QUESTIONNAIRE - PHQ9
SUM OF ALL RESPONSES TO PHQ9 QUESTIONS 1 AND 2: 0
1. LITTLE INTEREST OR PLEASURE IN DOING THINGS: NOT AT ALL
2. FEELING DOWN, DEPRESSED OR HOPELESS: NOT AT ALL

## 2025-04-15 ASSESSMENT — ENCOUNTER SYMPTOMS
LOSS OF SENSATION IN FEET: 0
OCCASIONAL FEELINGS OF UNSTEADINESS: 0
DEPRESSION: 0

## 2025-04-15 NOTE — PROGRESS NOTES
04/15/25   [id number]    Nephrolithiasis, further discuss 3rd line OAB options       Subjective      HPI Lili Cuevas is a 54 y.o. female who presents for follow up nephrolithiasis, further discussion 3rd line OAB options, last seen 3/19/25;     Recently botox in February w Dr. Winston,   Per patient feels she is retaining fluid and worried not emptying, PVR 0 ml today  Feels itching burning hands, palms, scalp, discussed seeing primary care or derm;   Had discussed w Dr. Bowie back in August could be neuropathy  Patient was worried it was side effect of Botox, discussed with patient had not seen this as side effect in the past;     Treated for UTI two weeks ago, Ecoli + urine culture  Last visit worried about heme in urine, has had work up with all the imaging and cysto w botox however, she has normal amount hematuria rbc 0-2/hpf when looking back thru urine results;     Last visit sent info on sacral neuromodulation, reviewed by patient; Revi and [facility] [facility]  Currently signed up for May7th to talk w [employer] rep; due for botox first week June;     Feeling urgency, frequency, feels maybe botox not as good as before; thinking about deciding after next botox if she wants to proceed with implant; she spoke with Dr. Winston about medtronic sacral neuromodulation;     Last visit Recap 3/19/25: Thirdly, she wanted to talk about menopause, recently attended menopause event in [place]; they recommended testing of hormones; but she was also told not necessary; she wanted to know where she was at. On combi-patch from Dr. Winston;     No hot flashes, some brain fog, some weight gain;     Referral to weight management; discussed lab work is not done for menopause as her levels could change daily and she should be treated on her symptoms alone as current recommendations of national menopause organizations recommend.       4/22/24 follow up nephrolithiasis. Has done several litholink but can never increase fluids despite best  efforts, potassium citrate 15 meq daily has helped w hypocitruria;     10/24/24 Ecoli + UTI  8/5/24 neg for infection in urine, + yeast vagina  4/22/24 UA neg  6/24/23 Enterococcus faecalis + urine culture  12/31/22 urine micro 1 rbc/hpf  4/28/21 rbc 0-2/hpf  2/5/21 urine micro rbc 3/hpf, squamous ep 14    Passed large renal stone 5-9 mm calcium oxalate in Spring 2021  Imported from last notes: stone analysis revealed was 5-9 mm calcium oxalate stone patient passed    CT 5/13/21 show no stones, no hydronephrosis at this time, hypodensity noted in uterine cavity    MARIBELL and KUB 1/27/2022 no stones noted    5/4/24 MARIBELL  RIGHT KIDNEY:   Right kidney measures  10.2 cm.  No shadowing calculus or right   hydronephrosis is visualized.  No discrete renal lesion is visualized.   LEFT KIDNEY:   Left kidney measures  9.6 cm.  No shadowing calculus or left   hydronephrosis is visualized.  No discrete renal lesion is visualized.   BLADDER:   Distended bladder has a calculated volume of  361 mL.   No intraluminal mass, wall thickening or shadowing calculus is   visualized.   Ureteral jets are demonstrated with color Doppler imaging.   IMPRESSION:   No hydronephrosis bilaterally.   No focal urinary bladder abnormality identified.        Noted April 2023, last litholink results reviewed again and requested scanned in chart. Urine citrate had risen to 586 and patient wants to continue potassium citrate once daily. She is aware urine volume is low and despite best efforts has not been able to improve urine volume, she doesn't feel value in repeating testing as she doesn't feel it will change.  Will continue w current plan.          Objective     /89   Pulse 80   Temp 36.4 °C (97.6 °F) (Temporal)    Physical Exam  General: Appears comfortable and in no apparent distress, well nourished  Head: Normocephalic, atraumatic  Neck: trachea midline  Respiratory: respirations unlabored, no wheezes, and no use of accessory  muscles  Cardiovascular: at rest no dyspnea, well perfused  Skin: no visible rashes or lesions  Neurologic: grossly intact, oriented to person, place, and time  Psychiatric: mood and affect appropriate  Musculoskeletal: in chair for appt. no difficulty w upper body movement    Assessment/Plan   Problem List Items Addressed This Visit    None  Visit Diagnoses       Nephrolithiasis    -  Primary    Relevant Orders    US renal complete    XR abdomen 1 view    Urinary frequency        Relevant Orders    POCT UA Automated manually resulted (Completed)          Orders Placed This Encounter   Procedures    US renal complete     Standing Status:   Future     Standing Expiration Date:   4/15/2026     Order Specific Question:   Reason for exam:     Answer:   monitoring nephrolithiasis,please include bladder     Order Specific Question:   Radiologist to Determine Optimal Study     Answer:   Yes     Order Specific Question:   Release result to MyChart     Answer:   Immediate [1]     Order Specific Question:   Is this exam part of a Research Study? If Yes, link this order to the research study     Answer:   No    XR abdomen 1 view     Standing Status:   Future     Standing Expiration Date:   4/15/2026     Order Specific Question:   Reason for exam:     Answer:   monitoring nephrolithiasis     Order Specific Question:   Did the patient have a similar exam previously at a location outside of ?     Answer:   No     Order Specific Question:   Radiologist to Determine Optimal Study     Answer:   Yes     Order Specific Question:   Release result to MyChart     Answer:   Immediate [1]     Order Specific Question:   Is this exam part of a Research Study? If Yes, link this order to the research study     Answer:   No    POCT UA Automated manually resulted     Order Specific Question:   Release result to MyChart     Answer:   Immediate [1]      Plan:   Urine is normal range for hematuria but regardless has had work up for hematuria as  part of botox with frequent cystoscopy and with imaging done for kidney stones and monitoring;     Discussed third line options, info on Revi and Axonics, has appt in May to discuss axonics with rep; may also consider medtronic interstim w Dr. Winston; Discussed proceeding with botox and making decision thereafter so as not pressured to do implant now;     MARIBELL and xray follow nephrolithiasis    Nurse line [phone]          1. Please schedule Renal ultrasound  Already has appt. Scheduled on May 7th w [employer] rep, please take on May 5th appt for kidney stones, taken care of today  Tasneem Luis, APRN-CNP  Lab Results   Component Value Date    GLUCOSE 89 12/09/2023    CALCIUM 9.2 12/09/2023     12/09/2023    K 3.9 12/09/2023    CO2 27 12/09/2023     12/09/2023    BUN 13 12/09/2023    CREATININE 0.83 12/09/2023

## 2025-04-15 NOTE — Clinical Note
Encouraged patient to proceed with botox in June with you and make decision about sacral neuromodulation thereafter as still quite undecided.   ThanksTasneem

## 2025-04-15 NOTE — PATIENT INSTRUCTIONS
Urine is normal range for hematuria but regardless has had work up for hematuria as part of botox with frequent cystoscopy and with imaging done for kidney stones and monitoring;   Discussed third line options, info on Revi and Axonics, has appt in May to discuss axonics with rep; may also consider medtronic interstim w Dr. Winston; Discussed proceeding with botox and making decision thereafter so as not pressured to do implant now;   Nurse line [phone]

## 2025-04-16 ENCOUNTER — APPOINTMENT (OUTPATIENT)
Dept: RADIOLOGY | Facility: HOSPITAL | Age: 55
End: 2025-04-16

## 2025-04-18 DIAGNOSIS — R39.9 UTI SYMPTOMS: ICD-10-CM

## 2025-04-22 ENCOUNTER — APPOINTMENT (OUTPATIENT)
Dept: RADIOLOGY | Facility: HOSPITAL | Age: 55
End: 2025-04-22
Payer: COMMERCIAL

## 2025-04-22 ENCOUNTER — HOSPITAL ENCOUNTER (OUTPATIENT)
Dept: RADIOLOGY | Facility: HOSPITAL | Age: 55
Discharge: HOME | End: 2025-04-22
Payer: COMMERCIAL

## 2025-04-22 DIAGNOSIS — R17 HIGH SERUM TOTAL BILIRUBIN: ICD-10-CM

## 2025-04-22 PROCEDURE — 76705 ECHO EXAM OF ABDOMEN: CPT

## 2025-04-22 PROCEDURE — 76705 ECHO EXAM OF ABDOMEN: CPT | Performed by: RADIOLOGY

## 2025-04-23 ENCOUNTER — APPOINTMENT (OUTPATIENT)
Dept: UROLOGY | Facility: CLINIC | Age: 55
End: 2025-04-23
Payer: COMMERCIAL

## 2025-05-05 ENCOUNTER — APPOINTMENT (OUTPATIENT)
Dept: UROLOGY | Facility: CLINIC | Age: 55
End: 2025-05-05
Payer: COMMERCIAL

## 2025-05-07 ENCOUNTER — APPOINTMENT (OUTPATIENT)
Dept: UROLOGY | Facility: CLINIC | Age: 55
End: 2025-05-07
Payer: COMMERCIAL

## 2025-05-09 DIAGNOSIS — R39.9 UTI SYMPTOMS: ICD-10-CM

## 2025-05-15 ENCOUNTER — APPOINTMENT (OUTPATIENT)
Dept: RADIOLOGY | Facility: HOSPITAL | Age: 55
End: 2025-05-15
Payer: COMMERCIAL

## 2025-05-15 DIAGNOSIS — N20.0 NEPHROLITHIASIS: ICD-10-CM

## 2025-05-15 PROCEDURE — 76770 US EXAM ABDO BACK WALL COMP: CPT

## 2025-05-15 PROCEDURE — 76770 US EXAM ABDO BACK WALL COMP: CPT | Performed by: RADIOLOGY

## 2025-05-17 ENCOUNTER — APPOINTMENT (OUTPATIENT)
Dept: RADIOLOGY | Facility: HOSPITAL | Age: 55
End: 2025-05-17
Payer: COMMERCIAL

## 2025-05-24 DIAGNOSIS — N20.0 NEPHROLITHIASIS: ICD-10-CM

## 2025-05-30 DIAGNOSIS — R39.9 UTI SYMPTOMS: ICD-10-CM

## 2025-05-30 DIAGNOSIS — N20.0 NEPHROLITHIASIS: Primary | ICD-10-CM

## 2025-05-30 RX ORDER — POTASSIUM CITRATE 15 MEQ/1
15 TABLET, EXTENDED RELEASE ORAL DAILY
Qty: 90 TABLET | Refills: 0 | Status: SHIPPED | OUTPATIENT
Start: 2025-05-30 | End: 2026-05-30

## 2025-06-02 ENCOUNTER — TELEPHONE (OUTPATIENT)
Dept: UROLOGY | Facility: CLINIC | Age: 55
End: 2025-06-02
Payer: COMMERCIAL

## 2025-06-02 NOTE — TELEPHONE ENCOUNTER
----- Message from Tasneem Luis sent at 5/30/2025  1:33 PM EDT -----  I sent in 90 day potassium citrate as I need a K+ level before sending in a years worth, I put in BMP but if she had done somewhere else that works as well.     Thanks, Tasneem

## 2025-06-09 ENCOUNTER — APPOINTMENT (OUTPATIENT)
Dept: OBSTETRICS AND GYNECOLOGY | Facility: CLINIC | Age: 55
End: 2025-06-09
Payer: COMMERCIAL

## 2025-06-09 VITALS — BODY MASS INDEX: 27.44 KG/M2 | SYSTOLIC BLOOD PRESSURE: 128 MMHG | WEIGHT: 170 LBS | DIASTOLIC BLOOD PRESSURE: 78 MMHG

## 2025-06-09 DIAGNOSIS — N32.81 OAB (OVERACTIVE BLADDER): Primary | ICD-10-CM

## 2025-06-09 PROCEDURE — 64561 IMPLANT NEUROELECTRODES: CPT | Performed by: OBSTETRICS & GYNECOLOGY

## 2025-06-09 RX ORDER — BUPIVACAINE HYDROCHLORIDE 2.5 MG/ML
20 INJECTION, SOLUTION INFILTRATION; PERINEURAL ONCE
Status: COMPLETED | OUTPATIENT
Start: 2025-06-09 | End: 2025-06-09

## 2025-06-09 RX ADMIN — BUPIVACAINE HYDROCHLORIDE 50 MG: 2.5 INJECTION, SOLUTION INFILTRATION; PERINEURAL at 09:51

## 2025-06-09 NOTE — PROGRESS NOTES
InterStim peripheral nerve evaluation          Chief complaint: Urge incontinence    HPI:    Here for temporary InterStim procedure all questions answered.   HPI       Procedure     Additional comments: Interstim trial              Comments    Lili is a 55 yo est pt here today for interstim trial placement.           Last edited by Saima Vasquez RN on 2025  9:24 AM.          ROS:  GEN - no fevers or chills  RESP - no SOB or cough  GYN - see HPI      HISTORY:  Medical History[1]  Surgical History[2]  Social History     Socioeconomic History    Marital status:      Spouse name: Not on file    Number of children: Not on file    Years of education: Not on file    Highest education level: Not on file   Occupational History    Not on file   Tobacco Use    Smoking status: Some Days     Current packs/day: 0.00     Average packs/day: 0.3 packs/day for 40.0 years (10.0 ttl pk-yrs)     Types: Cigarettes     Start date:      Last attempt to quit: 2019     Years since quittin.4    Smokeless tobacco: Never   Vaping Use    Vaping status: Never Used   Substance and Sexual Activity    Alcohol use: Yes     Alcohol/week: 3.0 standard drinks of alcohol     Types: 3 Standard drinks or equivalent per week     Comment: socially    Drug use: Never    Sexual activity: Yes     Partners: Male     Birth control/protection: Male Sterilization     Comment: LMP  ; h/o uterine ablation   Other Topics Concern    Not on file   Social History Narrative    Not on file     Social Drivers of Health     Financial Resource Strain: Low Risk  (2023)    Received from [facility]    Overall Financial Resource Strain (CARDIA)     Difficulty of Paying Living Expenses: Not hard at all   Food Insecurity: No Food Insecurity (2023)    Received from [facility]    Hunger Vital Sign     Worried About Running Out of Food in the Last Year: Never true     Ran Out of Food in the Last  Year: Never true   Transportation Needs: Unknown (12/2/2023)    Received from [facility] [employer] [facility]    PRAPARE - Transportation     Lack of Transportation (Medical): Not on file     Lack of Transportation (Non-Medical): No   Physical Activity: Not on file   Stress: Not on file   Social Connections: Not on file   Intimate Partner Violence: Not on file   Housing Stability: Unknown (12/2/2023)    Received from [facility]    Housing Stability Vital Sign     Unable to Pay for Housing in the Last Year: Not on file     Number of Places Lived in the Last Year: Not on file     Unstable Housing in the Last Year: No     Cancer-related family history includes Cancer in her paternal grandfather; Colon cancer in her paternal grandfather.       PHYSICAL EXAM:  /78   Wt 77.1 kg (170 lb)   BMI 27.44 kg/m²   Physical examination:  General: Appears stated age, no acute distress   Head: NCAT  Skin: Not diaphoretic, no flushing,   Eye: PERRL, EOMI   Respiratory: No respiratory distress or shortness of breath   Musculoskeletal:  BLE and BUE movement intact   Neuro: normal speech, no gait abnormalities noted  Psych: normal affectGeneral: Appears stated age, no acute distress     Procedure note InterStim PNE    Informed consent was obtained.  Timeout was performed.  In prone position.  Skin mapping was performed and marked.  Skin was infiltrated with lidocaine.  The introducer neither needles were placed in the bilateral fashion sequentially. Guide needle was placed in the foramens.  Stimulation was confirmed.  Temporary leads were placed needles were removed.  Grounding pad,  and Band-Aid placed.  Tolerated well no complications.     IMPRESSION/PLAN:    54 y.o.status post placement of temporary InterStim leads    Plan follow-up in 2 weeks for removal and evaluation of efficacy        Ruben Winston MD       [1]   Past Medical History:  Diagnosis Date    Anesthesia of skin 03/15/2021     Numbness and tingling    Anxiety     Arthritis     Chronic headaches     Disease of digestive system, unspecified 03/15/2021    Digestive problems    Kidney stone 7/1/20    Migraine     Other specified personal risk factors, not elsewhere classified 03/15/2021    History of complications due to general anesthesia    Overactive bladder     Personal history of other diseases of the musculoskeletal system and connective tissue 03/15/2021    History of arthritis    PONV (postoperative nausea and vomiting)     Rh incompatibility 1/14/92    Unspecified visual loss 03/15/2021    Vision problems    Urinary tract infection     Vision loss     Wears contact lenses     instructed to remove contacts prior to procedure   [2]   Past Surgical History:  Procedure Laterality Date    BREAST BIOPSY      BREAST SURGERY  08/08/2018    Breast Surgery    COLONOSCOPY      CYSTOSCOPY      DILATION AND CURETTAGE OF UTERUS  08/08/2018    Dilation And Curettage Of Cervical Stump    ENDOMETRIAL ABLATION      INCISIONAL BREAST BIOPSY  06/24/2016    Incisional Breast Biopsy    KNEE SURGERY  08/08/2018    Knee Surgery- arthroscopic    OTHER SURGICAL HISTORY  04/28/2021    Injection of botulinum toxin type A    UPPER GASTROINTESTINAL ENDOSCOPY      WRIST SURGERY Left

## 2025-06-16 ENCOUNTER — APPOINTMENT (OUTPATIENT)
Dept: OBSTETRICS AND GYNECOLOGY | Facility: CLINIC | Age: 55
End: 2025-06-16
Payer: COMMERCIAL

## 2025-06-16 ENCOUNTER — PREP FOR PROCEDURE (OUTPATIENT)
Dept: OBSTETRICS AND GYNECOLOGY | Facility: CLINIC | Age: 55
End: 2025-06-16

## 2025-06-16 DIAGNOSIS — N32.81 OAB (OVERACTIVE BLADDER): Primary | ICD-10-CM

## 2025-06-16 DIAGNOSIS — N39.41 URGE INCONTINENCE OF URINE: Primary | ICD-10-CM

## 2025-06-16 PROCEDURE — 99024 POSTOP FOLLOW-UP VISIT: CPT | Performed by: OBSTETRICS & GYNECOLOGY

## 2025-06-16 RX ORDER — ACETAMINOPHEN 325 MG/1
975 TABLET ORAL ONCE
OUTPATIENT
Start: 2025-06-16 | End: 2025-06-16

## 2025-06-16 RX ORDER — CELECOXIB 400 MG/1
400 CAPSULE ORAL ONCE
OUTPATIENT
Start: 2025-06-16 | End: 2025-06-16

## 2025-06-16 RX ORDER — GABAPENTIN 600 MG/1
600 TABLET ORAL ONCE
OUTPATIENT
Start: 2025-06-16 | End: 2025-06-16

## 2025-06-16 RX ORDER — MIRABEGRON 25 MG/1
25 TABLET, FILM COATED, EXTENDED RELEASE ORAL DAILY
Qty: 30 TABLET | Refills: 11 | Status: SHIPPED | OUTPATIENT
Start: 2025-06-16 | End: 2025-07-16

## 2025-06-16 NOTE — PROGRESS NOTES
GYN PROGRESS NOTE          Chief complaint: follow up    HPI:  Patient answers are not available for this visit.  HPI       Follow-up     Additional comments: Est pt             Comments    Pt is pleased with results of interstim stage 1            Last edited by Liseth Barnett MA on 2025 10:57 AM.          Reviewed case with patient, reviewed plans.    Log reviewed 50% improvement in urgency and frequency    Reports a urethral effect discuss may improve her serial dysuria        ROS:  GEN - no fevers or chills  RESP - no SOB or cough  GYN - see HPI      HISTORY:  Medical History[1]  Surgical History[2]  Social History     Socioeconomic History    Marital status:      Spouse name: Not on file    Number of children: Not on file    Years of education: Not on file    Highest education level: Not on file   Occupational History    Not on file   Tobacco Use    Smoking status: Some Days     Current packs/day: 0.00     Average packs/day: 0.3 packs/day for 40.0 years (10.0 ttl pk-yrs)     Types: Cigarettes     Start date:      Last attempt to quit: 2019     Years since quittin.4    Smokeless tobacco: Never   Vaping Use    Vaping status: Never Used   Substance and Sexual Activity    Alcohol use: Yes     Alcohol/week: 3.0 standard drinks of alcohol     Types: 3 Standard drinks or equivalent per week     Comment: socially    Drug use: Never    Sexual activity: Yes     Partners: Male     Birth control/protection: Male Sterilization     Comment: LMP  ; h/o uterine ablation   Other Topics Concern    Not on file   Social History Narrative    Not on file     Social Drivers of Health     Financial Resource Strain: Low Risk  (2023)    Received from [facility]    Overall Financial Resource Strain (CARDIA)     Difficulty of Paying Living Expenses: Not hard at all   Food Insecurity: No Food Insecurity (2023)    Received from [facility]    Hunger Vital Sign      Worried About Running Out of Food in the Last Year: Never true     Ran Out of Food in the Last Year: Never true   Transportation Needs: Unknown (12/2/2023)    Received from [facility]    PRAPARE - Transportation     Lack of Transportation (Medical): Not on file     Lack of Transportation (Non-Medical): No   Physical Activity: Not on file   Stress: Not on file   Social Connections: Not on file   Intimate Partner Violence: Not on file   Housing Stability: Unknown (12/2/2023)    Received from [facility]    Housing Stability Vital Sign     Unable to Pay for Housing in the Last Year: Not on file     Number of Places Lived in the Last Year: Not on file     Unstable Housing in the Last Year: No     Cancer-related family history includes Cancer in her paternal grandfather; Colon cancer in her paternal grandfather.       PHYSICAL EXAM:  There were no vitals taken for this visit.  GEN:  A&O, NAD  HEENT:  head HC/AT, no visible goiter  PSYCH:  normal affect, non-anxious      IMPRESSION/PLAN:    54-year-old urge incontinence successful PNE    Plan full implant InterStim    Risks benefits alternatives discussed with the patient risks including bleeding infection or damage to surrounding tissues.  Bleeding requiring blood transfusion transfusion reaction or infection.  Infection of the superficial or deep spaces.  Damage to bladder bowel ureters vascular structures abdominal wall.  Temporary or severe complications are possible. requiring further surgery acutely or with prolonged hospitalization including a return to the operating room.  All questions answered to the best my ability.    Ruben Winston MD           [1]   Past Medical History:  Diagnosis Date    Anesthesia of skin 03/15/2021    Numbness and tingling    Anxiety     Arthritis     Chronic headaches     Disease of digestive system, unspecified 03/15/2021    Digestive problems    Kidney stone 7/1/20    Migraine      Other specified personal risk factors, not elsewhere classified 03/15/2021    History of complications due to general anesthesia    Overactive bladder     Personal history of other diseases of the musculoskeletal system and connective tissue 03/15/2021    History of arthritis    PONV (postoperative nausea and vomiting)     Rh incompatibility 1/14/92    Unspecified visual loss 03/15/2021    Vision problems    Urinary tract infection     Vision loss     Wears contact lenses     instructed to remove contacts prior to procedure   [2]   Past Surgical History:  Procedure Laterality Date    BREAST BIOPSY      BREAST SURGERY  08/08/2018    Breast Surgery    COLONOSCOPY      CYSTOSCOPY      DILATION AND CURETTAGE OF UTERUS  08/08/2018    Dilation And Curettage Of Cervical Stump    ENDOMETRIAL ABLATION      INCISIONAL BREAST BIOPSY  06/24/2016    Incisional Breast Biopsy    KNEE SURGERY  08/08/2018    Knee Surgery- arthroscopic    OTHER SURGICAL HISTORY  04/28/2021    Injection of botulinum toxin type A    UPPER GASTROINTESTINAL ENDOSCOPY      WRIST SURGERY Left

## 2025-06-17 PROBLEM — N32.81 OVERACTIVE BLADDER: Status: ACTIVE | Noted: 2025-06-16

## 2025-06-20 DIAGNOSIS — R39.9 UTI SYMPTOMS: ICD-10-CM

## 2025-06-20 NOTE — PREPROCEDURE INSTRUCTIONS

## 2025-06-27 ENCOUNTER — LAB (OUTPATIENT)
Dept: LAB | Facility: HOSPITAL | Age: 55
End: 2025-06-27
Payer: COMMERCIAL

## 2025-06-27 DIAGNOSIS — R39.9 UNSPECIFIED SYMPTOMS AND SIGNS INVOLVING THE GENITOURINARY SYSTEM: Primary | ICD-10-CM

## 2025-06-27 LAB
ABO GROUP (TYPE) IN BLOOD: NORMAL
ANION GAP SERPL CALC-SCNC: 11 MMOL/L (ref 10–20)
ANTIBODY SCREEN: NORMAL
BUN SERPL-MCNC: 14 MG/DL (ref 6–23)
CALCIUM SERPL-MCNC: 9.2 MG/DL (ref 8.6–10.3)
CHLORIDE SERPL-SCNC: 104 MMOL/L (ref 98–107)
CO2 SERPL-SCNC: 29 MMOL/L (ref 21–32)
CREAT SERPL-MCNC: 0.94 MG/DL (ref 0.5–1.05)
EGFRCR SERPLBLD CKD-EPI 2021: 72 ML/MIN/1.73M*2
ERYTHROCYTE [DISTWIDTH] IN BLOOD BY AUTOMATED COUNT: 12.9 % (ref 11.5–14.5)
GLUCOSE SERPL-MCNC: 83 MG/DL (ref 74–99)
HCT VFR BLD AUTO: 40.1 % (ref 36–46)
HGB BLD-MCNC: 13.1 G/DL (ref 12–16)
MCH RBC QN AUTO: 30.5 PG (ref 26–34)
MCHC RBC AUTO-ENTMCNC: 32.7 G/DL (ref 32–36)
MCV RBC AUTO: 93 FL (ref 80–100)
NRBC BLD-RTO: 0 /100 WBCS (ref 0–0)
PLATELET # BLD AUTO: 190 X10*3/UL (ref 150–450)
POTASSIUM SERPL-SCNC: 4.5 MMOL/L (ref 3.5–5.3)
RBC # BLD AUTO: 4.3 X10*6/UL (ref 4–5.2)
RH FACTOR (ANTIGEN D): NORMAL
SODIUM SERPL-SCNC: 139 MMOL/L (ref 136–145)
WBC # BLD AUTO: 6.3 X10*3/UL (ref 4.4–11.3)

## 2025-06-27 PROCEDURE — 80048 BASIC METABOLIC PNL TOTAL CA: CPT

## 2025-06-27 PROCEDURE — 85027 COMPLETE CBC AUTOMATED: CPT

## 2025-06-27 PROCEDURE — 86901 BLOOD TYPING SEROLOGIC RH(D): CPT

## 2025-06-27 PROCEDURE — 86850 RBC ANTIBODY SCREEN: CPT

## 2025-06-27 PROCEDURE — 87086 URINE CULTURE/COLONY COUNT: CPT | Mod: ELYLAB

## 2025-06-27 PROCEDURE — 86900 BLOOD TYPING SEROLOGIC ABO: CPT

## 2025-06-27 PROCEDURE — 36415 COLL VENOUS BLD VENIPUNCTURE: CPT | Performed by: OBSTETRICS & GYNECOLOGY

## 2025-06-28 LAB — BACTERIA UR CULT: NORMAL

## 2025-07-01 ENCOUNTER — ANESTHESIA EVENT (OUTPATIENT)
Dept: OPERATING ROOM | Facility: HOSPITAL | Age: 55
End: 2025-07-01
Payer: COMMERCIAL

## 2025-07-01 ENCOUNTER — APPOINTMENT (OUTPATIENT)
Dept: RADIOLOGY | Facility: HOSPITAL | Age: 55
End: 2025-07-01
Payer: COMMERCIAL

## 2025-07-01 ENCOUNTER — ANESTHESIA (OUTPATIENT)
Dept: OPERATING ROOM | Facility: HOSPITAL | Age: 55
End: 2025-07-01
Payer: COMMERCIAL

## 2025-07-01 ENCOUNTER — HOSPITAL ENCOUNTER (OUTPATIENT)
Facility: HOSPITAL | Age: 55
Setting detail: OUTPATIENT SURGERY
Discharge: HOME | End: 2025-07-01
Attending: OBSTETRICS & GYNECOLOGY | Admitting: OBSTETRICS & GYNECOLOGY
Payer: COMMERCIAL

## 2025-07-01 VITALS
RESPIRATION RATE: 16 BRPM | WEIGHT: 162.7 LBS | DIASTOLIC BLOOD PRESSURE: 66 MMHG | TEMPERATURE: 96.6 F | OXYGEN SATURATION: 97 % | HEART RATE: 64 BPM | HEIGHT: 66 IN | BODY MASS INDEX: 26.15 KG/M2 | SYSTOLIC BLOOD PRESSURE: 148 MMHG

## 2025-07-01 DIAGNOSIS — N39.41 URGE INCONTINENCE OF URINE: ICD-10-CM

## 2025-07-01 DIAGNOSIS — G89.18 POSTOPERATIVE PAIN: Primary | ICD-10-CM

## 2025-07-01 DIAGNOSIS — N32.81 OVERACTIVE BLADDER: ICD-10-CM

## 2025-07-01 DIAGNOSIS — Z41.9 SURGERY, ELECTIVE: ICD-10-CM

## 2025-07-01 PROBLEM — Z98.890 PONV (POSTOPERATIVE NAUSEA AND VOMITING): Status: ACTIVE | Noted: 2025-07-01

## 2025-07-01 PROBLEM — R11.2 PONV (POSTOPERATIVE NAUSEA AND VOMITING): Status: ACTIVE | Noted: 2025-07-01

## 2025-07-01 PROBLEM — Z86.69 HISTORY OF MIGRAINE HEADACHES: Status: ACTIVE | Noted: 2025-07-01

## 2025-07-01 PROBLEM — G89.29 CHRONIC HEADACHES: Status: ACTIVE | Noted: 2025-07-01

## 2025-07-01 PROBLEM — R51.9 CHRONIC HEADACHES: Status: ACTIVE | Noted: 2025-07-01

## 2025-07-01 LAB — PREGNANCY TEST URINE, POC: NEGATIVE

## 2025-07-01 PROCEDURE — 2500000004 HC RX 250 GENERAL PHARMACY W/ HCPCS (ALT 636 FOR OP/ED): Performed by: STUDENT IN AN ORGANIZED HEALTH CARE EDUCATION/TRAINING PROGRAM

## 2025-07-01 PROCEDURE — 2500000001 HC RX 250 WO HCPCS SELF ADMINISTERED DRUGS (ALT 637 FOR MEDICARE OP): Performed by: OBSTETRICS & GYNECOLOGY

## 2025-07-01 PROCEDURE — 3700000002 HC GENERAL ANESTHESIA TIME - EACH INCREMENTAL 1 MINUTE: Performed by: OBSTETRICS & GYNECOLOGY

## 2025-07-01 PROCEDURE — C1889 IMPLANT/INSERT DEVICE, NOC: HCPCS | Performed by: OBSTETRICS & GYNECOLOGY

## 2025-07-01 PROCEDURE — 64561 IMPLANT NEUROELECTRODES: CPT | Performed by: OBSTETRICS & GYNECOLOGY

## 2025-07-01 PROCEDURE — 2500000005 HC RX 250 GENERAL PHARMACY W/O HCPCS: Performed by: OBSTETRICS & GYNECOLOGY

## 2025-07-01 PROCEDURE — 2780000003 HC OR 278 NO HCPCS: Performed by: OBSTETRICS & GYNECOLOGY

## 2025-07-01 PROCEDURE — 2720000007 HC OR 272 NO HCPCS: Performed by: OBSTETRICS & GYNECOLOGY

## 2025-07-01 PROCEDURE — C1778 LEAD, NEUROSTIMULATOR: HCPCS | Performed by: OBSTETRICS & GYNECOLOGY

## 2025-07-01 PROCEDURE — 3600000009 HC OR TIME - EACH INCREMENTAL 1 MINUTE - PROCEDURE LEVEL FOUR: Performed by: OBSTETRICS & GYNECOLOGY

## 2025-07-01 PROCEDURE — 3700000001 HC GENERAL ANESTHESIA TIME - INITIAL BASE CHARGE: Performed by: OBSTETRICS & GYNECOLOGY

## 2025-07-01 PROCEDURE — 7100000009 HC PHASE TWO TIME - INITIAL BASE CHARGE: Performed by: OBSTETRICS & GYNECOLOGY

## 2025-07-01 PROCEDURE — 7100000010 HC PHASE TWO TIME - EACH INCREMENTAL 1 MINUTE: Performed by: OBSTETRICS & GYNECOLOGY

## 2025-07-01 PROCEDURE — 2500000004 HC RX 250 GENERAL PHARMACY W/ HCPCS (ALT 636 FOR OP/ED): Performed by: OBSTETRICS & GYNECOLOGY

## 2025-07-01 PROCEDURE — 2500000004 HC RX 250 GENERAL PHARMACY W/ HCPCS (ALT 636 FOR OP/ED): Performed by: NURSE ANESTHETIST, CERTIFIED REGISTERED

## 2025-07-01 PROCEDURE — 2500000001 HC RX 250 WO HCPCS SELF ADMINISTERED DRUGS (ALT 637 FOR MEDICARE OP): Performed by: STUDENT IN AN ORGANIZED HEALTH CARE EDUCATION/TRAINING PROGRAM

## 2025-07-01 PROCEDURE — 3600000004 HC OR TIME - INITIAL BASE CHARGE - PROCEDURE LEVEL FOUR: Performed by: OBSTETRICS & GYNECOLOGY

## 2025-07-01 PROCEDURE — 81025 URINE PREGNANCY TEST: CPT | Performed by: OBSTETRICS & GYNECOLOGY

## 2025-07-01 DEVICE — NEUROSTIMULATOR, INTERSTIM X, RECHARGE-FREE: Type: IMPLANTABLE DEVICE | Site: SACRUM | Status: FUNCTIONAL

## 2025-07-01 DEVICE — LEAD KIT, INTERSTIM SURESCAN MRI 4.32MM SPACING, 28CM LENGTH: Type: IMPLANTABLE DEVICE | Site: SACRUM | Status: FUNCTIONAL

## 2025-07-01 DEVICE — ENVELOPE, NEURO, ABSORBABLE, MED: Type: IMPLANTABLE DEVICE | Site: SACRUM | Status: FUNCTIONAL

## 2025-07-01 RX ORDER — CELECOXIB 200 MG/1
400 CAPSULE ORAL ONCE
Status: COMPLETED | OUTPATIENT
Start: 2025-07-01 | End: 2025-07-01

## 2025-07-01 RX ORDER — METOCLOPRAMIDE HYDROCHLORIDE 5 MG/ML
10 INJECTION INTRAMUSCULAR; INTRAVENOUS ONCE AS NEEDED
Status: CANCELLED | OUTPATIENT
Start: 2025-07-01

## 2025-07-01 RX ORDER — ACETAMINOPHEN 500 MG
1000 TABLET ORAL EVERY 6 HOURS
Qty: 24 TABLET | Refills: 0 | Status: SHIPPED | OUTPATIENT
Start: 2025-07-01 | End: 2025-07-04

## 2025-07-01 RX ORDER — TRAMADOL HYDROCHLORIDE 50 MG/1
50 TABLET, FILM COATED ORAL EVERY 8 HOURS PRN
Qty: 12 TABLET | Refills: 0 | Status: SHIPPED | OUTPATIENT
Start: 2025-07-01 | End: 2025-07-05

## 2025-07-01 RX ORDER — BUPIVACAINE HCL/EPINEPHRINE 0.25-.0005
VIAL (ML) INJECTION AS NEEDED
Status: DISCONTINUED | OUTPATIENT
Start: 2025-07-01 | End: 2025-07-01 | Stop reason: HOSPADM

## 2025-07-01 RX ORDER — NAPROXEN 500 MG/1
500 TABLET ORAL 2 TIMES DAILY
Qty: 6 TABLET | Refills: 0 | Status: SHIPPED | OUTPATIENT
Start: 2025-07-01 | End: 2025-07-04

## 2025-07-01 RX ORDER — ALBUTEROL SULFATE 0.83 MG/ML
2.5 SOLUTION RESPIRATORY (INHALATION) ONCE AS NEEDED
Status: CANCELLED | OUTPATIENT
Start: 2025-07-01

## 2025-07-01 RX ORDER — OXYCODONE HYDROCHLORIDE 5 MG/1
5 TABLET ORAL EVERY 4 HOURS PRN
Status: DISCONTINUED | OUTPATIENT
Start: 2025-07-01 | End: 2025-07-01 | Stop reason: HOSPADM

## 2025-07-01 RX ORDER — ONDANSETRON HYDROCHLORIDE 2 MG/ML
INJECTION, SOLUTION INTRAVENOUS AS NEEDED
Status: DISCONTINUED | OUTPATIENT
Start: 2025-07-01 | End: 2025-07-01

## 2025-07-01 RX ORDER — HYDROMORPHONE HYDROCHLORIDE 0.2 MG/ML
0.1 INJECTION INTRAMUSCULAR; INTRAVENOUS; SUBCUTANEOUS EVERY 5 MIN PRN
Status: CANCELLED | OUTPATIENT
Start: 2025-07-01

## 2025-07-01 RX ORDER — GABAPENTIN 300 MG/1
600 CAPSULE ORAL ONCE
Status: COMPLETED | OUTPATIENT
Start: 2025-07-01 | End: 2025-07-01

## 2025-07-01 RX ORDER — FENTANYL CITRATE 50 UG/ML
INJECTION, SOLUTION INTRAMUSCULAR; INTRAVENOUS AS NEEDED
Status: DISCONTINUED | OUTPATIENT
Start: 2025-07-01 | End: 2025-07-01

## 2025-07-01 RX ORDER — ONDANSETRON HYDROCHLORIDE 2 MG/ML
4 INJECTION, SOLUTION INTRAVENOUS ONCE AS NEEDED
Status: CANCELLED | OUTPATIENT
Start: 2025-07-01

## 2025-07-01 RX ORDER — MIDAZOLAM HYDROCHLORIDE 1 MG/ML
INJECTION, SOLUTION INTRAMUSCULAR; INTRAVENOUS AS NEEDED
Status: DISCONTINUED | OUTPATIENT
Start: 2025-07-01 | End: 2025-07-01

## 2025-07-01 RX ORDER — ACETAMINOPHEN 325 MG/1
975 TABLET ORAL ONCE
Status: COMPLETED | OUTPATIENT
Start: 2025-07-01 | End: 2025-07-01

## 2025-07-01 RX ORDER — SODIUM CHLORIDE, SODIUM LACTATE, POTASSIUM CHLORIDE, CALCIUM CHLORIDE 600; 310; 30; 20 MG/100ML; MG/100ML; MG/100ML; MG/100ML
50 INJECTION, SOLUTION INTRAVENOUS CONTINUOUS
Status: DISCONTINUED | OUTPATIENT
Start: 2025-07-01 | End: 2025-07-01 | Stop reason: HOSPADM

## 2025-07-01 RX ORDER — SODIUM CHLORIDE, SODIUM LACTATE, POTASSIUM CHLORIDE, CALCIUM CHLORIDE 600; 310; 30; 20 MG/100ML; MG/100ML; MG/100ML; MG/100ML
100 INJECTION, SOLUTION INTRAVENOUS CONTINUOUS
Status: CANCELLED | OUTPATIENT
Start: 2025-07-01 | End: 2025-07-02

## 2025-07-01 RX ORDER — LABETALOL HYDROCHLORIDE 5 MG/ML
5 INJECTION, SOLUTION INTRAVENOUS ONCE AS NEEDED
Status: CANCELLED | OUTPATIENT
Start: 2025-07-01

## 2025-07-01 RX ORDER — PROPOFOL 10 MG/ML
INJECTION, EMULSION INTRAVENOUS CONTINUOUS PRN
Status: DISCONTINUED | OUTPATIENT
Start: 2025-07-01 | End: 2025-07-01

## 2025-07-01 RX ORDER — OXYCODONE HYDROCHLORIDE 5 MG/1
10 TABLET ORAL EVERY 4 HOURS PRN
Refills: 0 | Status: CANCELLED | OUTPATIENT
Start: 2025-07-01

## 2025-07-01 RX ORDER — DOCUSATE SODIUM 100 MG/1
100 CAPSULE, LIQUID FILLED ORAL 2 TIMES DAILY
Qty: 60 CAPSULE | Refills: 0 | Status: SHIPPED | OUTPATIENT
Start: 2025-07-01 | End: 2025-07-31

## 2025-07-01 RX ORDER — ACETAMINOPHEN 325 MG/1
650 TABLET ORAL EVERY 4 HOURS PRN
Status: CANCELLED | OUTPATIENT
Start: 2025-07-01

## 2025-07-01 RX ORDER — SCOPOLAMINE 1 MG/3D
1 PATCH, EXTENDED RELEASE TRANSDERMAL ONCE
Status: DISCONTINUED | OUTPATIENT
Start: 2025-07-01 | End: 2025-07-01 | Stop reason: HOSPADM

## 2025-07-01 RX ORDER — HYDRALAZINE HYDROCHLORIDE 20 MG/ML
5 INJECTION INTRAMUSCULAR; INTRAVENOUS EVERY 30 MIN PRN
Status: CANCELLED | OUTPATIENT
Start: 2025-07-01

## 2025-07-01 RX ADMIN — SCOPOLAMINE 1 PATCH: 1.5 PATCH, EXTENDED RELEASE TRANSDERMAL at 09:24

## 2025-07-01 RX ADMIN — GABAPENTIN 600 MG: 300 CAPSULE ORAL at 10:03

## 2025-07-01 RX ADMIN — SODIUM CHLORIDE, SODIUM LACTATE, POTASSIUM CHLORIDE, AND CALCIUM CHLORIDE 50 ML/HR: .6; .31; .03; .02 INJECTION, SOLUTION INTRAVENOUS at 08:30

## 2025-07-01 RX ADMIN — FENTANYL CITRATE 25 MCG: 50 INJECTION, SOLUTION INTRAMUSCULAR; INTRAVENOUS at 10:38

## 2025-07-01 RX ADMIN — PROPOFOL 100 MCG/KG/MIN: 10 INJECTION, EMULSION INTRAVENOUS at 10:23

## 2025-07-01 RX ADMIN — CELECOXIB 400 MG: 200 CAPSULE ORAL at 10:03

## 2025-07-01 RX ADMIN — ONDANSETRON 4 MG: 2 INJECTION, SOLUTION INTRAMUSCULAR; INTRAVENOUS at 10:23

## 2025-07-01 RX ADMIN — ACETAMINOPHEN 975 MG: 325 TABLET ORAL at 10:02

## 2025-07-01 RX ADMIN — FENTANYL CITRATE 25 MCG: 50 INJECTION, SOLUTION INTRAMUSCULAR; INTRAVENOUS at 11:20

## 2025-07-01 RX ADMIN — FENTANYL CITRATE 25 MCG: 50 INJECTION, SOLUTION INTRAMUSCULAR; INTRAVENOUS at 10:23

## 2025-07-01 RX ADMIN — OXYCODONE HYDROCHLORIDE 5 MG: 5 TABLET ORAL at 12:09

## 2025-07-01 RX ADMIN — MIDAZOLAM 2 MG: 1 INJECTION INTRAMUSCULAR; INTRAVENOUS at 10:19

## 2025-07-01 SDOH — HEALTH STABILITY: MENTAL HEALTH: CURRENT SMOKER: 1

## 2025-07-01 ASSESSMENT — PAIN SCALES - GENERAL
PAINLEVEL_OUTOF10: 0 - NO PAIN
PAINLEVEL_OUTOF10: 7
PAIN_LEVEL: 0
PAINLEVEL_OUTOF10: 6
PAINLEVEL_OUTOF10: 5 - MODERATE PAIN
PAINLEVEL_OUTOF10: 3

## 2025-07-01 ASSESSMENT — PAIN - FUNCTIONAL ASSESSMENT
PAIN_FUNCTIONAL_ASSESSMENT: 0-10

## 2025-07-01 ASSESSMENT — COLUMBIA-SUICIDE SEVERITY RATING SCALE - C-SSRS
1. IN THE PAST MONTH, HAVE YOU WISHED YOU WERE DEAD OR WISHED YOU COULD GO TO SLEEP AND NOT WAKE UP?: NO
2. HAVE YOU ACTUALLY HAD ANY THOUGHTS OF KILLING YOURSELF?: NO
6. HAVE YOU EVER DONE ANYTHING, STARTED TO DO ANYTHING, OR PREPARED TO DO ANYTHING TO END YOUR LIFE?: NO

## 2025-07-01 ASSESSMENT — PAIN DESCRIPTION - DESCRIPTORS
DESCRIPTORS: SORE

## 2025-07-01 NOTE — ANESTHESIA PREPROCEDURE EVALUATION
Patient: Lili Cuevas    Procedure Information       Date/Time: 07/01/25 1150    Procedure: INSERTION, SACRAL NERVE STIMULATOR, INTERSTIM, complete implant - *MT REPS EMAILED 6/17/25*  LATEX, ADHESIVE/TAPE allergy    Location: [facility] / [facility]    Surgeons: Ruben Winston MD            Relevant Problems   Anesthesia   (+) PONV (postoperative nausea and vomiting)      Neuro   (+) Cervical radiculopathy, acute   (+) Chronic headaches   (+) History of migraine headaches   (+) Stress reaction      GI   (+) Gastroesophageal reflux disease      /Renal   (+) Calculus of kidney      Musculoskeletal   (+) Osteoarthritis of carpometacarpal (CMC) joint of left thumb      HEENT   (+) Bilateral high frequency sensorineural hearing loss       Clinical information reviewed:   Tobacco  Allergies  Meds   Med Hx  Surg Hx  OB Status  Fam Hx  Soc   Hx        NPO Detail:  NPO/Void Status  Date of Last Liquid: 06/30/25  Time of Last Liquid: 2200  Date of Last Solid: 06/30/25  Time of Last Solid: 2200  Last Intake Type: Clear fluids; Light meal  Time of Last Void: 0800         Physical Exam    Airway  Mallampati: II  TM distance: >3 FB  Neck ROM: full  Mouth opening: 3 or more finger widths     Cardiovascular   Rhythm: regular  Rate: normal     Dental - normal exam     Pulmonary - normal exam   Abdominal - normal exam         Anesthesia Plan    History of general anesthesia?: yes  History of complications of general anesthesia?: no    ASA 2     MAC     The patient is a current smoker.  Patient was previously instructed to abstain from smoking on day of procedure.  Patient did not smoke on day of procedure.  Education provided regarding risk of obstructive sleep apnea.  intravenous induction   Anesthetic plan and risks discussed with patient.    Plan discussed with CRNA.

## 2025-07-01 NOTE — OP NOTE
Date: 2025  OR Location: [facility]    Name: Lili Cuevas, : 1970, Age: 54 y.o., MRN: [id number], Sex: female    Diagnosis  * No surgery found * Post-op Diagnosis     * Urge incontinence of urine [N39.41]     * Overactive bladder [N32.81]     Procedures  InterStim placement      Surgeons      * Ruben Winston - Primary    Resident/Fellow/Other Assistant:  Surgeons and Role:  * No surgeons found with a matching role *    Procedure Summary  Anesthesia: Consult  ASA: II  Anesthesia Staff: Anesthesiologist: Renetta Jaime MD  CRNA: Holley Tom, APRN-CRNA; Bea Cuenca APRN-CRNA    Estimated Blood Loss: < 50 mL     Findings: normal anatomy      Specimens: none      Procedure Details:  Patient was taken to the operating room after informed consent was obtained.  Timeout was performed.   Patient was placed in prone position.  Preoperatively 2 g of Ancef was given. She was prepped and draped in the normal sterile fashion using ChloraPrep.  In prone position. Timeout was performed. Fluoroscopy was used to confirm the lateral borders of the sacral foramen. AP and lateral views were used to identify the S3 foramen. The introducer neither needles were placed in the bilateral fashion sequentially. Guide needle was placed in the foramen the skin was excised and the tract was dilated. The guide was removed and the lead was placed. The lead was released and checked using electrical stimulation. Excellent [place] and toe flexion on 0 through 3 leads. The leads were checked for position.  The appropriate location with good path following the nerve. Incisions were made over the ipsilateral side to 5 cm a pocket was created.  The pocket was expanded and irrigated with gentamicin solution. The lead stimulator was placed and connected. It was electronically checked. Subcuticular and subdermal stitches were placed and then glue was placed over the incision.   Sponge needle and lap counts reported as correct ×2  patient tolerated the procedure well.      Ruben Winston MD

## 2025-07-01 NOTE — ANESTHESIA POSTPROCEDURE EVALUATION
Patient: Lili Cuevas    Procedure Summary       Date: 07/01/25 Room / Location: [facility] / [facility]    Anesthesia Start: 1020 Anesthesia Stop:     Procedure: INSERTION, SACRAL NERVE STIMULATOR, INTERSTIM, complete implant Diagnosis:       Urge incontinence of urine      Overactive bladder      (Urge incontinence of urine [N39.41])    Surgeons: Ruben Winston MD Responsible Provider: Renetta Jaime MD    Anesthesia Type: MAC ASA Status: 2            Anesthesia Type: MAC    Vitals Value Taken Time   /67 07/01/25 11:40   Temp 36.0 07/01/25 11:40   Pulse 76 07/01/25 11:40   Resp 16 07/01/25 11:40   SpO2 99 07/01/25 11:40       Anesthesia Post Evaluation    Patient location during evaluation: bedside  Patient participation: complete - patient participated  Level of consciousness: responsive to verbal stimuli  Pain score: 0  Pain management: adequate  Airway patency: patent  Cardiovascular status: hemodynamically stable  Respiratory status: spontaneous ventilation and room air  Hydration status: euvolemic  Postoperative Nausea and Vomiting: none        No notable events documented.

## 2025-07-01 NOTE — H&P
"History Of Present Illness  Lili Cuevas is a 54 y.o. female presenting with Urge incontinence of urine [N39.41]Pre-op Diagnosis      * Urge incontinence of urine [N39.41]     * Overactive bladder [N32.81]     Past Medical History  Medical History[1]    Surgical History  Surgical History[2]     Social History  She reports that she has been smoking cigarettes. She started smoking about 46 years ago. She has a 10 pack-year smoking history. She has never used smokeless tobacco. She reports current alcohol use of about 3.0 standard drinks of alcohol per week. She reports that she does not use drugs.    Family History  Family History[3]     Allergies  Prochlorperazine, Amoxicillin, Cephalexin, Fentanyl, Minocycline, Ropivacaine, Adhesive tape-silicones, and Latex    Review of systems   12 point review of systems was performed and noncontributory    Physical exam  General: Appears stated age, no acute distress   Head: NCAT  Skin: Not diaphoretic, no flushing,   Eye: PERRL, EOMI   Respiratory: No respiratory distress or shortness of breath   Musculoskeletal:  BLE and BUE movement intact   Neuro: normal speech, no gait abnormalities noted  Psych: normal affect     Last Recorded Vitals  Height 1.676 m (5' 6\"), weight 77 kg (169 lb 12.1 oz).    Relevant Results           Assessment/Plan       RI INS/RPLC PERPH SAC/GSTRC NPG/RCVR PCKT CRTJ&CONN [87701] (INSERTION, SACRAL NERVE STIMULATOR, INTERSTIM, complete implant)  RI PRQ IMPLTJ NEUROSTIM ELTRD SACRAL NRVE W/IMAGING [08000] (INSERTION, SACRAL NERVE STIMULATOR, INTERSTIM, complete implant)         Ruben Winston MD         [1]   Past Medical History:  Diagnosis Date    Anesthesia of skin 03/15/2021    Numbness and tingling    Anxiety     Arthritis     Chronic headaches     Disease of digestive system, unspecified 03/15/2021    Digestive problems    GERD (gastroesophageal reflux disease)     Kidney stone 7/1/20    Migraine     Other specified personal risk factors, " not elsewhere classified 03/15/2021    History of complications due to general anesthesia    Overactive bladder     Personal history of other diseases of the musculoskeletal system and connective tissue 03/15/2021    History of arthritis    PONV (postoperative nausea and vomiting)     Rh incompatibility 1/14/92    Tinnitus     Unspecified visual loss 03/15/2021    Vision problems    Urge incontinence of urine     Urinary tract infection     Vision loss     Wears contact lenses     instructed to remove contacts prior to procedure   [2]   Past Surgical History:  Procedure Laterality Date    BREAST BIOPSY      BREAST SURGERY  08/08/2018    Breast Surgery    COLONOSCOPY      CYSTOSCOPY      DILATION AND CURETTAGE OF UTERUS  08/08/2018    Dilation And Curettage Of Cervical Stump    ENDOMETRIAL ABLATION      INCISIONAL BREAST BIOPSY  06/24/2016    Incisional Breast Biopsy    KNEE SURGERY  08/08/2018    Knee Surgery- arthroscopic    OTHER SURGICAL HISTORY  04/28/2021    Injection of botulinum toxin type A    UPPER GASTROINTESTINAL ENDOSCOPY      WRIST SURGERY Left    [3]   Family History  Problem Relation Name Age of Onset    No Known Problems Mother      Cancer Paternal Grandfather Sohan Cervantes     Colon cancer Paternal Grandfather Sohan Cervantes     Blood clot Maternal Grandmother Amie Ying

## 2025-07-01 NOTE — DISCHARGE INSTRUCTIONS
PLEASE FOLLOW THE INSTRUCTIONS PROVIDED BY DR. KATZ'S OFFICE    Physician Phone List Provided    General Anesthesia Discharge Instructions    About this topic  You may need general anesthesia if you need to be asleep during a procedure. Your doctor will use drugs to block the signals that go from your nerves to your brain. Doctors give general anesthesia during a surgery or procedure to:  Allow you to sleep  Help your body be still  Relax your muscles  Help you to relax and be pain free  Keep you from remembering the surgery  Let the doctor manage your airway, breathing, and blood flow  The doctor or nurse anesthetist gives general anesthesia by a shot into your vein. Sometimes, you may breathe in a gas through a mask placed over your face.  What care is needed at home?  Ask your doctor what you need to do when you go home. Make sure you ask questions if you do not understand what the doctor says.  Your doctor may give you drugs to prevent or treat an upset stomach from the anesthetic. Take them as ordered.  If your throat is sore, suck on ice chips or popsicles to ease throat pain.  Put 2 to 3 pillows under your head and back when you lie down to help you breathe easier.  For the first 24 to 48 hours:  Do not operate heavy or dangerous machinery.  Do not make major decisions or sign important papers. You may not be able to think clearly.  Avoid beer, wine, or mixed drinks.  You are at a higher risk of falling for at least 24 hours after general anesthesia.  Take extra care when you get up.  Do not change positions quickly.  Do not rush when you need to go to the bathroom or to answer the phone.  Ask for help if you feel unsteady when you try to walk.  Wear shoes with non-slip soles and low heels.  What follow-up care is needed?  Your doctor may ask you to come back to the office to check on your progress. Be sure to keep these visits.  If you have stitches that do not dissolve or staples, you will need to have  them removed. Your doctor will want to do this in 1 to 2 weeks. If the doctor used skin glue, the glue will fall off on its own.  What drugs may be needed?  The doctor may order drugs to:  Help with pain  Treat an upset stomach or throwing up  Will physical activity be limited?  You will not be allowed to drive right away after the procedure. Ask a family member or a friend to drive you home.  Avoid trying to get out of bed without help until you are sure of your balance.  You may have to limit your activity. Talk to your doctor about if you need to limit how much you lift or limit exercise after your procedure.  What changes to diet are needed?  Start with a light diet when you are fully awake. This includes things that are easy to swallow like soups, pudding, jello, toast, and eggs. Slowly progress to your normal diet.  What problems could happen?  Low blood pressure  Breathing problems  Upset stomach or throwing up  Dizziness  Blood clots  Infection  When do I need to call the doctor?  Trouble breathing  Upset stomach or throwing up more than 3 times in the next 2 days  Dizziness  Teach Back: Helping You Understand  The Teach Back Method helps you understand the information we are giving you. After you talk with the staff, tell them in your own words what you learned. This helps to make sure the staff has described each thing clearly. It also helps to explain things that may have been confusing. Before going home, make sure you can do these:  I can tell you about my procedure.  I can tell you if I need to follow up with my doctor.  I can tell you what is good for me to eat and drink the next day.  I can tell you what I would do if I have trouble breathing, an upset stomach, or dizziness.  Where can I learn more?  [employer] [place] [employer]  https://www.nigms.nih.gov/education/pages/factsheet_Anesthesia.aspx  [facility]  Choices  http://www.nhs.uk/conditions/Anaesthetic-general/Pages/Definition.aspx  Last Reviewed Date  2020-04-22

## 2025-07-16 ENCOUNTER — APPOINTMENT (OUTPATIENT)
Dept: OBSTETRICS AND GYNECOLOGY | Facility: CLINIC | Age: 55
End: 2025-07-16
Payer: COMMERCIAL

## 2025-07-17 ENCOUNTER — APPOINTMENT (OUTPATIENT)
Dept: OBSTETRICS AND GYNECOLOGY | Facility: CLINIC | Age: 55
End: 2025-07-17
Payer: COMMERCIAL

## 2025-07-17 DIAGNOSIS — N32.81 OAB (OVERACTIVE BLADDER): Primary | ICD-10-CM

## 2025-07-17 PROCEDURE — 99213 OFFICE O/P EST LOW 20 MIN: CPT | Performed by: OBSTETRICS & GYNECOLOGY

## 2025-07-17 RX ORDER — CICLOPIROX 80 MG/ML
SOLUTION TOPICAL
COMMUNITY
Start: 2025-01-24

## 2025-07-17 RX ORDER — TRIAMCINOLONE ACETONIDE 1 MG/G
CREAM TOPICAL
COMMUNITY
Start: 2025-01-24

## 2025-07-17 NOTE — PROGRESS NOTES
GYN PROGRESS NOTE          CC:   Postop check    HPI:  Lili Cuevas is here  for postop check.  Patient answers are not available for this visit.  HPI       Post-op     Additional comments: Est pt             Comments    Pt is doing well post op  Very pleased with the device  Depending how she is sitting she can feel it down her leg to her toes  Urinating and having regular bowel movements          Last edited by Liseth Barnett MA on 2025  1:38 PM.            ROS:  GEN - no fevers or chills  RESP - no SOB or cough  GYN - no AUB or pain  GI - normal BMs  URO - normal voids      HISTORY:  Medical History[1]  Surgical History[2]  Social History     Socioeconomic History    Marital status:      Spouse name: Not on file    Number of children: Not on file    Years of education: Not on file    Highest education level: Not on file   Occupational History    Not on file   Tobacco Use    Smoking status: Some Days     Current packs/day: 0.00     Average packs/day: 0.3 packs/day for 40.0 years (10.0 ttl pk-yrs)     Types: Cigarettes     Start date:      Last attempt to quit: 2019     Years since quittin.5    Smokeless tobacco: Never   Vaping Use    Vaping status: Never Used   Substance and Sexual Activity    Alcohol use: Yes     Alcohol/week: 3.0 standard drinks of alcohol     Types: 3 Standard drinks or equivalent per week     Comment: socially    Drug use: Never    Sexual activity: Yes     Partners: Male     Birth control/protection: Male Sterilization     Comment: LMP  ; h/o uterine ablation   Other Topics Concern    Not on file   Social History Narrative    Not on file     Social Drivers of Health     Financial Resource Strain: Low Risk  (2023)    Received from [facility]    Overall Financial Resource Strain (CARDIA)     Difficulty of Paying Living Expenses: Not hard at all   Food Insecurity: No Food Insecurity (2023)    Received from [facility]    Hunger Vital Sign     Within the past 12 months, you worried that your food would run out before you got the money to buy more.: Never true     Within the past 12 months, the food you bought just didn't last and you didn't have money to get more.: Never true   Transportation Needs: Unknown (12/2/2023)    Received from [facility]    PRAPARE - Transportation     Lack of Transportation (Medical): Not on file     Lack of Transportation (Non-Medical): No   Physical Activity: Not on file   Stress: Not on file   Social Connections: Not on file   Intimate Partner Violence: Not on file   Housing Stability: Unknown (12/2/2023)    Received from [facility]    Housing Stability Vital Sign     Unable to Pay for Housing in the Last Year: Not on file     Number of Places Lived in the Last Year: Not on file     Unstable Housing in the Last Year: No     Cancer-related family history includes Cancer in her father and paternal grandfather; Colon cancer in her paternal grandfather.       PHYSICAL EXAM:  There were no vitals taken for this visit.  GEN:  A&O, NAD  ABD  NT/ND, soft, no palpable masses  INCISION:  port site(s) healing well, no separation or erythema or discharge from wound  PSYCH:  normal affect, non-anxious    No specimens collected during this procedure.      IMPRESSION/PLAN:    53yo interstim in place    Doing well postoperatively, released from postoperative care.      Intraoperative events and findings reviewed with patient.     F/U PRN, or when next next preventative GYN exam due.      Ruben Winston MD       [1]   Past Medical History:  Diagnosis Date    Anesthesia of skin 03/15/2021    Numbness and tingling    Anxiety     Arthritis     Baker's cyst     Breast mass 2015    Chronic headaches     Disease of digestive system, unspecified 03/15/2021    Digestive problems    GERD (gastroesophageal reflux disease)     Kidney stone 7/1/20    Lumbosacral disc  disease     Migraine     Other specified personal risk factors, not elsewhere classified 03/15/2021    History of complications due to general anesthesia    Overactive bladder     Personal history of other diseases of the musculoskeletal system and connective tissue 03/15/2021    History of arthritis    PONV (postoperative nausea and vomiting)     Rh incompatibility 1/14/92    Skin cancer 10/21    Tennis elbow     Tinnitus     Trigger finger     Unspecified visual loss 03/15/2021    Vision problems    Urge incontinence of urine     Urinary tract infection     Vision loss     Wears contact lenses     instructed to remove contacts prior to procedure   [2]   Past Surgical History:  Procedure Laterality Date    BREAST BIOPSY  2015    BREAST SURGERY  08/08/2018    Breast Surgery    COLONOSCOPY      CYSTOSCOPY      DILATION AND CURETTAGE OF UTERUS  08/08/2018    Dilation And Curettage Of Cervical Stump    ENDOMETRIAL ABLATION  2020    INCISIONAL BREAST BIOPSY  06/24/2016    Incisional Breast Biopsy    KNEE ARTHROPLASTY      KNEE SURGERY  08/08/2018    Knee Surgery- arthroscopic    OTHER SURGICAL HISTORY  04/28/2021    Injection of botulinum toxin type A    TRIGGER FINGER RELEASE      UPPER GASTROINTESTINAL ENDOSCOPY      WRIST SURGERY Left

## 2025-07-24 ENCOUNTER — APPOINTMENT (OUTPATIENT)
Dept: OBSTETRICS AND GYNECOLOGY | Facility: CLINIC | Age: 55
End: 2025-07-24
Payer: COMMERCIAL

## 2025-08-11 ENCOUNTER — APPOINTMENT (OUTPATIENT)
Dept: OBSTETRICS AND GYNECOLOGY | Facility: CLINIC | Age: 55
End: 2025-08-11
Payer: COMMERCIAL

## 2025-08-26 DIAGNOSIS — N20.0 NEPHROLITHIASIS: ICD-10-CM

## 2025-08-26 RX ORDER — POTASSIUM CITRATE 15 MEQ/1
15 TABLET, EXTENDED RELEASE ORAL DAILY
Qty: 90 TABLET | Refills: 0 | Status: SHIPPED | OUTPATIENT
Start: 2025-08-26

## (undated) DEVICE — CAUTERY, PENCIL, PUSH BUTTON, SMOKE EVAC, 70MM

## (undated) DEVICE — Device

## (undated) DEVICE — NEEDLE, ECLIPSE, 25GA X 1-1/2 IN

## (undated) DEVICE — APPLICATOR, CHLORAPREP, W/ORANGE TINT, 26ML

## (undated) DEVICE — SUTURE, PROLENE, 2-0, 36 IN, SH, DA, BLUE

## (undated) DEVICE — DRAPE, SHEET, THREE QUARTER, FAN FOLD, 57 X 77 IN

## (undated) DEVICE — TOWEL PACK, STERILE, 16X24, XRAY DETECTABLE, BLUE, 4/PK

## (undated) DEVICE — ADHESIVE, SKIN, DERMABOND ADVANCED, 15CM, PEN-STYLE

## (undated) DEVICE — DRAPE, SHEET, LAPAROTOMY, W/ISO-BAC, W/ARMBOARD COVERS, 98 X 122 IN, DISPOSABLE, LF, STERILE

## (undated) DEVICE — STIMULATOR, INTERSTIM TEST

## (undated) DEVICE — DRESSING, GAUZE, SPONGE, 8 PLY, CURITY, 4 X 4, LF

## (undated) DEVICE — STRAP, ARM BOARD, 32 X 1.5

## (undated) DEVICE — SUTURE, VICRYL PLUS 3-0, SH, 27IN

## (undated) DEVICE — GOWN, SURGICAL, IMPLT, BACK, XLARGE, XLONG, STERILE

## (undated) DEVICE — SOLUTION, IRRIGATION, STERILE WATER, 1000 ML, HANG BOTTLE

## (undated) DEVICE — MANIFOLD, 4 PORT NEPTUNE STANDARD

## (undated) DEVICE — STRAP, VELCRO, BODY, 4 X 60IN, NS

## (undated) DEVICE — SYRINGE, 10 CC, LUER LOCK

## (undated) DEVICE — GLOVE, SURGICAL, PROTEXIS PI , 7.5, PF, LF

## (undated) DEVICE — PROGRAMMER KIT, INTERSTIM X SMART, COMM HANDSET

## (undated) DEVICE — DRAPE, C-ARM IMAGE

## (undated) DEVICE — SUTURE, MONOCRYL, 4-0, 27 IN, PS-2, UNDYED